# Patient Record
Sex: FEMALE | Race: BLACK OR AFRICAN AMERICAN | NOT HISPANIC OR LATINO | Employment: FULL TIME | URBAN - METROPOLITAN AREA
[De-identification: names, ages, dates, MRNs, and addresses within clinical notes are randomized per-mention and may not be internally consistent; named-entity substitution may affect disease eponyms.]

---

## 2018-01-23 LAB
INFLUENZA A ANTIGEN, POC: NEGATIVE
INFLUENZA B ANTIGEN, POC: NEGATIVE
RAPID GROUP A STREP (OHS): NEGATIVE

## 2019-01-22 ENCOUNTER — HISTORICAL (OUTPATIENT)
Dept: LAB | Facility: HOSPITAL | Age: 39
End: 2019-01-22

## 2019-01-22 ENCOUNTER — HISTORICAL (OUTPATIENT)
Dept: RADIOLOGY | Facility: HOSPITAL | Age: 39
End: 2019-01-22

## 2019-01-22 LAB
ABS NEUT (OLG): 3.78 X10(3)/MCL (ref 2.1–9.2)
AMPHET UR QL SCN: NORMAL
APPEARANCE, UA: CLEAR
B-HCG FREE SERPL-ACNC: NORMAL MIU/ML
BACTERIA SPEC CULT: ABNORMAL /HPF
BARBITURATE SCN PRESENT UR: NORMAL
BASOPHILS # BLD AUTO: 0 X10(3)/MCL (ref 0–0.2)
BASOPHILS NFR BLD AUTO: 0 %
BENZODIAZ UR QL SCN: NORMAL
BILIRUB UR QL STRIP: NEGATIVE
CANNABINOIDS UR QL SCN: NORMAL
COCAINE UR QL SCN: NORMAL
COLOR UR: YELLOW
EOSINOPHIL # BLD AUTO: 0 X10(3)/MCL (ref 0–0.9)
EOSINOPHIL NFR BLD AUTO: 1 %
ERYTHROCYTE [DISTWIDTH] IN BLOOD BY AUTOMATED COUNT: 17.7 % (ref 11.5–17)
GLUCOSE (UA): NEGATIVE
GROUP & RH: NORMAL
HBV SURFACE AG SERPL QL IA: NEGATIVE
HCT VFR BLD AUTO: 34.9 % (ref 37–47)
HGB BLD-MCNC: 10.5 GM/DL (ref 12–16)
HGB UR QL STRIP: NEGATIVE
HIV 1+2 AB+HIV1 P24 AG SERPL QL IA: NEGATIVE
KETONES UR QL STRIP: NEGATIVE
LEUKOCYTE ESTERASE UR QL STRIP: ABNORMAL
LYMPHOCYTES # BLD AUTO: 0.8 X10(3)/MCL (ref 0.6–4.6)
LYMPHOCYTES NFR BLD AUTO: 16 %
MCH RBC QN AUTO: 26.4 PG (ref 27–31)
MCHC RBC AUTO-ENTMCNC: 30.1 GM/DL (ref 33–36)
MCV RBC AUTO: 87.7 FL (ref 80–94)
MONOCYTES # BLD AUTO: 0.5 X10(3)/MCL (ref 0.1–1.3)
MONOCYTES NFR BLD AUTO: 9 %
NEUTROPHILS # BLD AUTO: 3.78 X10(3)/MCL (ref 2.1–9.2)
NEUTROPHILS NFR BLD AUTO: 74 %
NITRITE UR QL STRIP: NEGATIVE
OPIATES UR QL SCN: NORMAL
PCP UR QL: NORMAL
PH UR STRIP.AUTO: 6 [PH] (ref 5–7.5)
PH UR STRIP: 6 [PH] (ref 5–9)
PLATELET # BLD AUTO: 277 X10(3)/MCL (ref 130–400)
PMV BLD AUTO: 11.6 FL (ref 9.4–12.4)
PROT UR QL STRIP: NEGATIVE
RBC # BLD AUTO: 3.98 X10(6)/MCL (ref 4.2–5.4)
RBC #/AREA URNS HPF: ABNORMAL /[HPF]
SP GR FLD REFRACTOMETRY: 1.01 (ref 1–1.03)
SP GR UR STRIP: 1.01 (ref 1–1.03)
SQUAMOUS EPITHELIAL, UA: ABNORMAL
T PALLIDUM AB SER QL: NORMAL
UROBILINOGEN UR STRIP-ACNC: 1
WBC # SPEC AUTO: 5.1 X10(3)/MCL (ref 4.5–11.5)
WBC #/AREA URNS HPF: ABNORMAL /[HPF]

## 2020-06-24 LAB
PAP RECOMMENDATION EXT: NORMAL
PAP SMEAR: NORMAL

## 2022-04-10 ENCOUNTER — HISTORICAL (OUTPATIENT)
Dept: ADMINISTRATIVE | Facility: HOSPITAL | Age: 42
End: 2022-04-10

## 2022-04-28 VITALS
HEIGHT: 69 IN | WEIGHT: 218.25 LBS | DIASTOLIC BLOOD PRESSURE: 89 MMHG | SYSTOLIC BLOOD PRESSURE: 162 MMHG | OXYGEN SATURATION: 98 % | BODY MASS INDEX: 32.33 KG/M2

## 2022-09-21 ENCOUNTER — HISTORICAL (OUTPATIENT)
Dept: ADMINISTRATIVE | Facility: HOSPITAL | Age: 42
End: 2022-09-21

## 2023-05-03 ENCOUNTER — OFFICE VISIT (OUTPATIENT)
Dept: FAMILY MEDICINE | Facility: CLINIC | Age: 43
End: 2023-05-03
Payer: COMMERCIAL

## 2023-05-03 VITALS
HEART RATE: 71 BPM | HEIGHT: 69 IN | SYSTOLIC BLOOD PRESSURE: 143 MMHG | TEMPERATURE: 98 F | BODY MASS INDEX: 33.2 KG/M2 | WEIGHT: 224.19 LBS | OXYGEN SATURATION: 100 % | RESPIRATION RATE: 20 BRPM | DIASTOLIC BLOOD PRESSURE: 88 MMHG

## 2023-05-03 DIAGNOSIS — R22.1 NECK SWELLING: ICD-10-CM

## 2023-05-03 DIAGNOSIS — Z00.00 WELLNESS EXAMINATION: Primary | ICD-10-CM

## 2023-05-03 DIAGNOSIS — R03.0 ELEVATED BLOOD PRESSURE READING: ICD-10-CM

## 2023-05-03 PROCEDURE — 99386 PR PREVENTIVE VISIT,NEW,40-64: ICD-10-PCS | Mod: ,,, | Performed by: FAMILY MEDICINE

## 2023-05-03 PROCEDURE — 99386 PREV VISIT NEW AGE 40-64: CPT | Mod: ,,, | Performed by: FAMILY MEDICINE

## 2023-05-03 PROCEDURE — 3079F DIAST BP 80-89 MM HG: CPT | Mod: CPTII,,, | Performed by: FAMILY MEDICINE

## 2023-05-03 PROCEDURE — 3077F SYST BP >= 140 MM HG: CPT | Mod: CPTII,,, | Performed by: FAMILY MEDICINE

## 2023-05-03 PROCEDURE — 3008F BODY MASS INDEX DOCD: CPT | Mod: CPTII,,, | Performed by: FAMILY MEDICINE

## 2023-05-03 PROCEDURE — 3008F PR BODY MASS INDEX (BMI) DOCUMENTED: ICD-10-PCS | Mod: CPTII,,, | Performed by: FAMILY MEDICINE

## 2023-05-03 PROCEDURE — 1159F PR MEDICATION LIST DOCUMENTED IN MEDICAL RECORD: ICD-10-PCS | Mod: CPTII,,, | Performed by: FAMILY MEDICINE

## 2023-05-03 PROCEDURE — 3079F PR MOST RECENT DIASTOLIC BLOOD PRESSURE 80-89 MM HG: ICD-10-PCS | Mod: CPTII,,, | Performed by: FAMILY MEDICINE

## 2023-05-03 PROCEDURE — 1160F PR REVIEW ALL MEDS BY PRESCRIBER/CLIN PHARMACIST DOCUMENTED: ICD-10-PCS | Mod: CPTII,,, | Performed by: FAMILY MEDICINE

## 2023-05-03 PROCEDURE — 1160F RVW MEDS BY RX/DR IN RCRD: CPT | Mod: CPTII,,, | Performed by: FAMILY MEDICINE

## 2023-05-03 PROCEDURE — 1159F MED LIST DOCD IN RCRD: CPT | Mod: CPTII,,, | Performed by: FAMILY MEDICINE

## 2023-05-03 PROCEDURE — 3077F PR MOST RECENT SYSTOLIC BLOOD PRESSURE >= 140 MM HG: ICD-10-PCS | Mod: CPTII,,, | Performed by: FAMILY MEDICINE

## 2023-05-03 NOTE — PROGRESS NOTES
"  Patient ID: [id number]     Chief Complaint: Establish Care        HPI:     Telma Asher is a 42 y.o. female here today   Patient here to establish with PCP/Wellness visit- self referred to clinic by herself.  Patient is interested in obtaining baseline labs.    History reviewed. No pertinent past medical history.     Past Surgical History:   Procedure Laterality Date     SECTION      x3- 2001; 2006; 2008        Social History     Tobacco Use    Smoking status: Never    Smokeless tobacco: Never   Substance Use Topics    Alcohol use: Yes     Comment: ocassionally    Drug use: Never        Social History     Socioeconomic History    Marital status:      Spouse name: Bony Coelho Jr.    Number of children: 3        No current outpatient medications    Review of patient's allergies indicates:  No Known Allergies     Patient Care Team:  Sandra Love MD as PCP - General (Family Medicine)     Subjective:     Review of Systems    12 point review of systems conducted, negative except as stated in the history of present illness. See HPI for details.      Objective:     Visit Vitals  BP (!) 143/88   Pulse 71   Temp 97.9 °F (36.6 °C)   Resp 20   Ht 5' 9" (1.753 m)   Wt 101.7 kg (224 lb 3.2 oz)   LMP 2023 (Exact Date)   SpO2 100%   BMI 33.11 kg/m²       Physical Exam    General: Alert and oriented, No acute distress.  Head: Normocephalic, Atraumatic.  Eye: Pupils are equal, round and reactive to light, Extraocular movements are intact, Sclera non-icteric.  Ears/Nose/Throat: Normal, Mucosa moist,Clear.  Neck/Thyroid: Non-tender, No palpable thyromegaly or thyroid nodule, No lymphadenopathy, No JVD, Full range of motion.  Respiratory: Clear to auscultation bilaterally; No wheezes, rales or rhonchi,Non-labored respirations, Symmetrical chest wall expansion.  Cardiovascular: Regular rate and rhythm, S1/S2 normal, No murmurs, rubs or gallops.  Gastrointestinal: Soft, Non-tender, Non-distended, Normal " "bowel sounds, No palpable organomegaly.  Musculoskeletal: Normal range of motion.  Integumentary: Warm, Dry, Intact, No suspicious lesions or rashes.  Extremities: No clubbing, cyanosis or edema  Neurologic: No focal deficits, Cranial Nerves II-XII are grossly intact, Motor strength normal upper and lower extremities, Sensory exam intact.  Psychiatric: Normal interaction, Coherent speech, Euthymic mood, Appropriate affect       Assessment:       ICD-10-CM ICD-9-CM   1. Wellness examination  Z00.00 V70.0   2. Neck swelling  R22.1 784.2   3. Elevated blood pressure reading  R03.0 796.2        Plan:     Cervical Cancer Screening -  Patient to be referred to Gyn    Breast Cancer Screening - Mammogram orders given to patient.    Colon Cancer Screening - Colonoscopy to be referred at age 46    Eye Exam - Recommend annually.    Dental Exam - Recommend biannual exams.     Vaccinations -   There is no immunization history on file for this patient.       1. Wellness examination  Wellness: Five Rules Reviewed: Water/Nutrition-Real Food, Limit Fast Food/ Exercise 20-30 minutes most days of the week/ Mental health- "Is your work a calling or paycheck" Define 'What is your purpose-what matters to you' Stress- Is an Individual Response- Therefore Can be Controlled by the Individual. Meditation/ Immunizations/Multivitamin use-Vitamin D3/ Screenings-Patient given lab orders to be completed before wellness visit.    - CBC Auto Differential; Future  - Comprehensive Metabolic Panel; Future  - Lipid Panel; Future  - TSH; Future  - Hemoglobin A1C; Future  - Urinalysis; Future  - Hepatitis C Antibody; Future  - HIV 1/2 Ag/Ab (4th Gen); Future  - Mammo Digital Screening Bilat; Future  - Ambulatory referral/consult to Obstetrics / Gynecology; Future  - Urinalysis    2. Neck swelling  Check studies management based upon results.  Pathophysiology/treatment/dangers discussed.    - US Soft Tissue Head Neck Thyroid; Future    3. Elevated blood " pressure  Blood pressure is elevated- Pathophysiology/Treatment/ Dangers Discussed-patient to monitor blood pressure at home bring readings to next visit.          The patient's Health Maintenance was reviewed and the following appears to be due at this time:   Health Maintenance Due   Topic Date Due    Hepatitis C Screening  Never done    Cervical Cancer Screening  Never done    Lipid Panel  Never done    COVID-19 Vaccine (1) Never done    TETANUS VACCINE  Never done    Mammogram  Never done    Hemoglobin A1c (Diabetic Prevention Screening)  Never done         Follow up in about 2 weeks (around 5/17/2023). In addition to their scheduled follow up, the patient has also been instructed to follow up on as needed basis.     Future Appointments   Date Time Provider Department Center   5/4/2023  3:30 PM [facility] [place]AM[facility] [place] [facility]   5/4/2023  4:00 PM [facility] [place]1 [facility] [place] [facility]   5/22/2023  8:45 AM MD BRIAN Gomez MD

## 2023-05-10 ENCOUNTER — PATIENT OUTREACH (OUTPATIENT)
Dept: ADMINISTRATIVE | Facility: HOSPITAL | Age: 43
End: 2023-05-10
Payer: COMMERCIAL

## 2023-05-10 NOTE — PROGRESS NOTES
The following record(s)  below were uploaded for Health Maintenance .             PAP SMEAR  06/24/2020

## 2023-05-15 ENCOUNTER — HOSPITAL ENCOUNTER (OUTPATIENT)
Dept: RADIOLOGY | Facility: HOSPITAL | Age: 43
Discharge: HOME | End: 2023-05-15
Attending: FAMILY MEDICINE
Payer: COMMERCIAL

## 2023-05-15 DIAGNOSIS — R92.8 ABNORMAL MAMMOGRAM: ICD-10-CM

## 2023-05-15 PROCEDURE — 77061 BREAST TOMOSYNTHESIS UNI: CPT | Mod: 26,LT,, | Performed by: STUDENT IN AN ORGANIZED HEALTH CARE EDUCATION/TRAINING PROGRAM

## 2023-05-15 PROCEDURE — 77061 BREAST TOMOSYNTHESIS UNI: CPT | Mod: TC,LT

## 2023-05-15 PROCEDURE — 76642 US BREAST LEFT LIMITED: ICD-10-PCS | Mod: 26,LT,, | Performed by: STUDENT IN AN ORGANIZED HEALTH CARE EDUCATION/TRAINING PROGRAM

## 2023-05-15 PROCEDURE — 77065 MAMMO DIGITAL DIAGNOSTIC LEFT WITH TOMO: ICD-10-PCS | Mod: 26,LT,, | Performed by: STUDENT IN AN ORGANIZED HEALTH CARE EDUCATION/TRAINING PROGRAM

## 2023-05-15 PROCEDURE — 76642 ULTRASOUND BREAST LIMITED: CPT | Mod: 26,LT,, | Performed by: STUDENT IN AN ORGANIZED HEALTH CARE EDUCATION/TRAINING PROGRAM

## 2023-05-15 PROCEDURE — 76642 ULTRASOUND BREAST LIMITED: CPT | Mod: TC,LT

## 2023-05-15 PROCEDURE — 77065 DX MAMMO INCL CAD UNI: CPT | Mod: 26,LT,, | Performed by: STUDENT IN AN ORGANIZED HEALTH CARE EDUCATION/TRAINING PROGRAM

## 2023-05-15 PROCEDURE — 76882 US LMTD JT/FCL EVL NVASC XTR: CPT | Mod: 26,59,RT, | Performed by: STUDENT IN AN ORGANIZED HEALTH CARE EDUCATION/TRAINING PROGRAM

## 2023-05-15 PROCEDURE — 76882 US LMTD JT/FCL EVL NVASC XTR: CPT | Mod: TC,RT

## 2023-05-15 PROCEDURE — 76882 US AXILLA ONLY (BREAST IMAGING) RIGHT: ICD-10-PCS | Mod: 26,59,RT, | Performed by: STUDENT IN AN ORGANIZED HEALTH CARE EDUCATION/TRAINING PROGRAM

## 2023-05-15 PROCEDURE — 77061 MAMMO DIGITAL DIAGNOSTIC LEFT WITH TOMO: ICD-10-PCS | Mod: 26,LT,, | Performed by: STUDENT IN AN ORGANIZED HEALTH CARE EDUCATION/TRAINING PROGRAM

## 2023-05-18 ENCOUNTER — HOSPITAL ENCOUNTER (OUTPATIENT)
Dept: RADIOLOGY | Facility: HOSPITAL | Age: 43
Discharge: HOME | End: 2023-05-18
Attending: FAMILY MEDICINE
Payer: COMMERCIAL

## 2023-05-18 DIAGNOSIS — R59.0 ENLARGED LYMPH NODES IN ARMPIT: ICD-10-CM

## 2023-05-18 PROCEDURE — 38505 NEEDLE BIOPSY LYMPH NODES: CPT

## 2023-05-18 PROCEDURE — 38505 NEEDLE BIOPSY LYMPH NODES: CPT | Mod: LT,,, | Performed by: RADIOLOGY

## 2023-05-18 PROCEDURE — 77061 BREAST TOMOSYNTHESIS UNI: CPT | Mod: TC,LT

## 2023-05-18 PROCEDURE — 77065 DX MAMMO INCL CAD UNI: CPT | Mod: 26,LT,, | Performed by: RADIOLOGY

## 2023-05-18 PROCEDURE — 38505 US BIOPSY LYMPH NODE AXILLA: ICD-10-PCS | Mod: LT,,, | Performed by: RADIOLOGY

## 2023-05-18 PROCEDURE — 77061 BREAST TOMOSYNTHESIS UNI: CPT | Mod: 26,LT,, | Performed by: RADIOLOGY

## 2023-05-18 PROCEDURE — 77065 MAMMO DIGITAL DIAGNOSTIC LEFT WITH TOMO: ICD-10-PCS | Mod: 26,LT,, | Performed by: RADIOLOGY

## 2023-05-18 PROCEDURE — 77061 MAMMO DIGITAL DIAGNOSTIC LEFT WITH TOMO: ICD-10-PCS | Mod: 26,LT,, | Performed by: RADIOLOGY

## 2023-05-19 ENCOUNTER — LAB REQUISITION (OUTPATIENT)
Dept: LAB | Facility: HOSPITAL | Age: 43
End: 2023-05-19
Payer: COMMERCIAL

## 2023-05-19 DIAGNOSIS — R59.9 ENLARGED LYMPH NODES, UNSPECIFIED: ICD-10-CM

## 2023-05-19 PROCEDURE — 88188 FLOWCYTOMETRY/READ 9-15: CPT | Performed by: PATHOLOGY

## 2023-05-19 PROCEDURE — 88184 FLOWCYTOMETRY/ TC 1 MARKER: CPT

## 2023-05-19 PROCEDURE — 88185 FLOWCYTOMETRY/TC ADD-ON: CPT

## 2023-05-22 LAB — MAYO GENERIC ORDERABLE RESULT: NORMAL

## 2023-05-23 LAB — PSYCHE PATHOLOGY RESULT: NORMAL

## 2023-05-24 ENCOUNTER — TELEPHONE (OUTPATIENT)
Dept: FAMILY MEDICINE | Facility: CLINIC | Age: 43
End: 2023-05-24
Payer: COMMERCIAL

## 2023-05-24 DIAGNOSIS — R59.9 ENLARGED LYMPH NODES, UNSPECIFIED: Primary | ICD-10-CM

## 2023-05-24 NOTE — PROGRESS NOTES
Axillary node biopsy path reviewed per Dr. Nunez--inconclusive result, recommend excision for further evaluation. Results and recommendation called to patient. Per patient request/availability, appointment scheduled with Dr. Nunes 6/1/23 @ 10:00 am. Patient is aware.

## 2023-05-24 NOTE — TELEPHONE ENCOUNTER
Patient called requesting a call back to discuss her biopsy. She stated that she saw some things that concerned her and she would like to talk to you about it. Patient has appointment scheduled for 5/29/23. Call back number is [phone]

## 2023-05-26 ENCOUNTER — DOCUMENTATION ONLY (OUTPATIENT)
Dept: SURGERY | Facility: CLINIC | Age: 43
End: 2023-05-26
Payer: COMMERCIAL

## 2023-05-26 NOTE — PROGRESS NOTES
05/26/23 08:52AM- Spoke w/ patient to review medication, allergies, and history; patient confirmed appt....tcc

## 2023-05-26 NOTE — PROGRESS NOTES
[facility] Breast Surg  Breast Surgical Oncology  New Patient Office Visit - H&P      Referring Provider: Dr. Sandra Love   PCP: Sandra Love MD     Chief Complaint:   Chief Complaint   Patient presents with    New Patient Breast Visit     New patient presented for follow up post ANB pathology inconclusive results. Was referred by Dr. Love.        Subjective:     HPI:  Telma Asher is a 42 y.o. female who presents on 2023 for evaluation of  left axillary adenopathy . She has a history of developing cold like symptoms (mostly consisting of cough, sore throat and sinus congestions) just before presenting for her screening mammogram on 2023. She subsequently found out her daughter had Covid and her symptoms worsened between the time of her additional views and her biopsy. The day after her biopsy she presented to the urgent care where they tested her for RSV (per patient - don't see in care everywhere records) and Covid. She was negative Covid and positive for RSV (per report). She was not given any medications, but conservative measures were discussed. She has since resolved. She also has a negative review of symptoms as listed below.    A detailed patient history was obtained and reviewed. She currently denies any breast issues including rashes, redness, pain, swelling, nipple discharge, or new lumps/masses.    MG breast density: Category B (scattered areas of fibroglandular tissue)     Imagin2023 BL SC MG at [facility] - which revealed in L breast  a focal asymmetry in the upper outer quadrant posterior depth.  There are lymph nodes with apparent cortical thickening within bilateral axillae. BIRADS-0;additional imaging needed.  5/15/2023 L DG MG/US L BREAST LIMITED/R US AXILLA at [facility] - which revealed on   L MG the focal asymmetry in the upper-outer quadrant posterior depth  effaces into normal fibroglandular tissue.  The mammographic view of the L axilla  demonstrates apparent cortical thickening of the axillary lymph nodes within the field of view. No other significant mammographic finding in the left breast and axilla.   L US - no suspicious sonographic finding in the upper-outer quadrant.  Along the left breast 1 o'clock - 2 o'clock area 10-12 cm FN, there is only normal fibroductoglandular tissue, correlating with the focal asymmetry recalled from screening mammography. In the level 1 steven stations of both axillae, there are morphologically abnormal lymph nodes with uniform cortical thickening (up to 0.6 cm) and partial to complete effacement of their fatty funmi.  BIRADS-4 suspicious;biopsy recommended.        Pathology:  2023 Ultrasound-guided Core Needle Biopsy Left Axilla - Lymph node with small CD 19, CD 20, and CD 10 positive, Kappa-restricted B cell population as tested by flow cytometry  IMMUNOHISTOCHEMICAL STAINS:   CD20 AND BCL-2: POSITIVE IN B LYMPHOCYTES.   CD3, CD5 AND CD23: POSITIVE IN T LYMPHOCYTES.   BCL-6 AND CD10: NEGATIVE.   KAPPA AND LAMBDA: INCONCLUSIVE.   KI-67: SHOWS NUCLEAR POSITIVITY IN 3-4% OF LYMPHOCYTES  Note - Morphologic and immunohistochemical analysis does not demonstrate evidence of lymphoma. The significance of the population identified by flow cytometry is unclear and may be due to sampling or oligoclonal reactive process, among others. Recommend excision for further evaluation.    OB/GYN History:  Age at Menarche Onset: 13  Menopausal Status: premenopausal, LMP: Patient's last menstrual period was 2023 (exact date).  Hysterectomy/Oophorectomy: NA, at age NA (one ovary removed)  Hormonal birth control (duration): Yes OCP (estrogen/progesterone).   Pregnancy History:   Age at first live birth: 20  Hormone Replacement Therapy: No, none  Patient did not breast feed.  Patient denies nipple discharge.   Patient denies to previous breast biopsy.   Patient denies to a personal history of breast cancer.    Other Relevant  History:  Prior thoracic RT: none  Genetic testing: No  Ashkenazi Druze descent: No    Family History:  Family History   Problem Relation Age of Onset    No Known Problems Mother     No Known Problems Father     Hypertension Maternal Grandmother     No Known Problems Paternal Grandmother     No Known Problems Paternal Grandfather         Past History:  Past Medical History:   Diagnosis Date    Endometriosis of uterus 2019    Removed at time of etopic pregnancy    Pregnancy 2019    I had an etopic pregnancy        Past Surgical History:   Procedure Laterality Date     SECTION      x3- ; ; 2008     SECTION  ,,, 2019     delivers and 1-etopic pregnancy 2019    LYMPH NODE BIOPSY Left 2023        Social History     Socioeconomic History    Marital status:      Spouse name: Bony Coelho Jr.    Number of children: 3   Occupational History    Occupation: [employer]:    Tobacco Use    Smoking status: Never    Smokeless tobacco: Never   Substance and Sexual Activity    Alcohol use: Yes     Comment: ocassionally    Drug use: Never    Sexual activity: Yes     Partners: Male     Birth control/protection: None        Body mass index is 33.2 kg/m².     Allergy/Medications:   Review of patient's allergies indicates:  No Known Allergies       Current Outpatient Medications:     Lactobac no.41/Bifidobact no.7 (PROBIOTIC-10 ORAL), Take by mouth. Nature Target Probiotics. .07oz, Disp: , Rfl:     mv-min/folic/vit K/lycop/coQ10 (DAILY MULTIVITAMIN ORAL), Take by mouth. [place] light multivitamin, Disp: , Rfl:     thymol/chlorophyllin (CHLOROPHYLL ORAL), Take by mouth. With Elderberry, ACV, [place], echinacea, and Vitamin D3, C, B12, Disp: , Rfl:        Review of Systems:  Review of Systems   Constitutional:  Negative for chills, fever and weight loss.   HENT:  Negative for sore throat.    Eyes:  Negative for blurred vision and double vision.   Respiratory:   "Negative for cough and shortness of breath.    Cardiovascular:  Negative for chest pain, palpitations and leg swelling.   Gastrointestinal:  Negative for abdominal pain, constipation, diarrhea, heartburn, nausea and vomiting.   Genitourinary:  Negative for dysuria, flank pain, frequency, hematuria and urgency.   Musculoskeletal:  Negative for back pain, joint pain and myalgias.   Neurological:  Negative for dizziness and headaches.   Endo/Heme/Allergies:  Does not bruise/bleed easily.   Psychiatric/Behavioral:  Negative for depression. The patient is not nervous/anxious.         Objective:     Vitals:  Blood pressure 123/77, pulse 78, temperature 98.5 °F (36.9 °C), temperature source Oral, resp. rate 18, height 5' 9" (1.753 m), weight 102 kg (224 lb 12.8 oz), last menstrual period 05/28/2023, SpO2 100 %.      Physical Exam:  General: The patient is awake, alert and oriented times three. The patient is well nourished and in no acute distress.   Neck: There is no evidence of palpable cervical, supraclavicular or axillary adenopathy. The neck is supple. The thyroid is not enlarged.   Musculoskeletal: The patient has a normal range of motion of her bilateral upper extremities.   Chest: Examination of the chest wall fails to reveal any obvious abnormalities. The lungs are clear to auscultation bilaterally without rales, rhonchi, or wheezing.   Cardiovascular: The heart has a regular rate and rhythm without murmurs, gallops or rubs.  Breast:  Right: Examination of right breast fails to reveal any dominant masses or areas of significant focal nodularity. The nipple is everted without evidence of discharge. There is no skin dimpling with movement of the pectoralis. There are no significant skin changes overlying the breast.   Left: Examination of the left breast fails to reveal any dominant masses or areas of significant focal nodularity. The nipple is everted without evidence of discharge. There is no skin dimpling with " "movement of the pectoralis. There are no significant skin changes overlying the breast.  Abdomen: The abdomen is soft, flat, nontender and nondistended with no palpable masses or organomegaly.  Integumentary: no rashes or skin lesions present  Neurologic: cranial nerves intact, no signs of peripheral neurological deficit, motor/sensory function intact    Assessment and Discussion:       Encounter Diagnoses   Name Primary?    Axillary lymphadenopathy Yes      Axillary lymphadenopathy can have many causes, such as infection, cancer, drug reactions, and more. She is not entirely wanting to have a surgical biopsy and she understands the biopsy results state morphologically no lymphoma was identified, however, there was a small amount of additional markers which makes the results difficult to interpret.    After discussion with her and colleagues, repeat short-term follow-up US would be an acceptable option, given the patient's clinical history. I would have a high index for surgical excision if persistent enlargement is seen.     We also discussed the surgical excision of a lymph node could be done with seed localization or palpation. However, currently on clinical exam given the habitus and pre-axillary adipose tissue, would recommend localization.       I would also like to obtain a CBC with differential        Plan:         Problem List Items Addressed This Visit          Other    Axillary lymphadenopathy - Primary    Current Assessment & Plan     1. Recommend she have a repeat Bilateral axillary US in 1-2 months (July 2023) after resolution of her symptoms. If at that time she continues to have abnormal lymph nodes, consideration for excisional biopsy is recommended.  2. Her pathology results from her recent core needle biopsy and clinical history were discussed with pathology (Dr. Tucker) and Hem/Onc (Dr. Win) "curbside" consultants.  3. Recommend a CBC with differential as well.              All of questions were " answered    Sherri Nunes MD  Breast Surgical Oncology     OFFICE VISIT CODING:    Non-face-to-face time included:  Yes Preparing to see the patient such as reviewing the patient record  Yes Obtaining and reviewing separately obtained history  Yes Independently interpreting results  Yes Documenting clinical information in electronic health record  No Ordering appropriate medications  Yes Ordering appropriate tests  Yes Ordering appropriate procedures (including follow-up)  Yes Referring and communicating with other health care professionals (not separately reported)  Yes Care Coordination (not separately reported)    Face-to-face time included:  Yes Performing a medically necessary appropriate history, examination, and/or evaluation  Yes Communicating results to the patient/family/caregiver  Yes Counseling and educating the patient/family/caregiver  Yes Answering patient/family/caregiver questions    Total Time: 57 minutes    Total time includes both face-to-face and non-face-to-face time personally spent by myself on the day of the visit.

## 2023-05-29 ENCOUNTER — OFFICE VISIT (OUTPATIENT)
Dept: FAMILY MEDICINE | Facility: CLINIC | Age: 43
End: 2023-05-29
Payer: COMMERCIAL

## 2023-05-29 VITALS
TEMPERATURE: 98 F | WEIGHT: 223 LBS | BODY MASS INDEX: 33.03 KG/M2 | OXYGEN SATURATION: 100 % | DIASTOLIC BLOOD PRESSURE: 80 MMHG | RESPIRATION RATE: 20 BRPM | HEIGHT: 69 IN | HEART RATE: 86 BPM | SYSTOLIC BLOOD PRESSURE: 140 MMHG

## 2023-05-29 DIAGNOSIS — Z71.2 ENCOUNTER TO DISCUSS TEST RESULTS: Primary | ICD-10-CM

## 2023-05-29 DIAGNOSIS — R03.0 ELEVATED BLOOD PRESSURE READING: ICD-10-CM

## 2023-05-29 PROCEDURE — 1159F MED LIST DOCD IN RCRD: CPT | Mod: CPTII,,, | Performed by: FAMILY MEDICINE

## 2023-05-29 PROCEDURE — 3077F PR MOST RECENT SYSTOLIC BLOOD PRESSURE >= 140 MM HG: ICD-10-PCS | Mod: CPTII,,, | Performed by: FAMILY MEDICINE

## 2023-05-29 PROCEDURE — 3077F SYST BP >= 140 MM HG: CPT | Mod: CPTII,,, | Performed by: FAMILY MEDICINE

## 2023-05-29 PROCEDURE — 1160F RVW MEDS BY RX/DR IN RCRD: CPT | Mod: CPTII,,, | Performed by: FAMILY MEDICINE

## 2023-05-29 PROCEDURE — 3079F PR MOST RECENT DIASTOLIC BLOOD PRESSURE 80-89 MM HG: ICD-10-PCS | Mod: CPTII,,, | Performed by: FAMILY MEDICINE

## 2023-05-29 PROCEDURE — 1160F PR REVIEW ALL MEDS BY PRESCRIBER/CLIN PHARMACIST DOCUMENTED: ICD-10-PCS | Mod: CPTII,,, | Performed by: FAMILY MEDICINE

## 2023-05-29 PROCEDURE — 1159F PR MEDICATION LIST DOCUMENTED IN MEDICAL RECORD: ICD-10-PCS | Mod: CPTII,,, | Performed by: FAMILY MEDICINE

## 2023-05-29 PROCEDURE — 99213 PR OFFICE/OUTPT VISIT, EST, LEVL III, 20-29 MIN: ICD-10-PCS | Mod: ,,, | Performed by: FAMILY MEDICINE

## 2023-05-29 PROCEDURE — 99213 OFFICE O/P EST LOW 20 MIN: CPT | Mod: ,,, | Performed by: FAMILY MEDICINE

## 2023-05-29 PROCEDURE — 3008F PR BODY MASS INDEX (BMI) DOCUMENTED: ICD-10-PCS | Mod: CPTII,,, | Performed by: FAMILY MEDICINE

## 2023-05-29 PROCEDURE — 3008F BODY MASS INDEX DOCD: CPT | Mod: CPTII,,, | Performed by: FAMILY MEDICINE

## 2023-05-29 PROCEDURE — 3079F DIAST BP 80-89 MM HG: CPT | Mod: CPTII,,, | Performed by: FAMILY MEDICINE

## 2023-05-29 NOTE — PROGRESS NOTES
"   Patient ID: [id number]     Chief Complaint: Follow-up      HPI:     Telma Asher is a 42 y.o. female here today to discuss test results.  1) Abnormal finding left axillary lymph node:  Patient is wondering about relationship of COVID exposure/RSV exposure and abnormal finding.  Does have follow-up appointment to discuss removal versus monitoring area.  2) Elevated blood pressure reading:  Patient has not been monitoring her blood pressure at home.  No symptoms associated with increased blood pressure-such as headaches, dizziness, chest pain, or shortness of breath.  History reviewed. No pertinent past medical history.     Past Surgical History:   Procedure Laterality Date     SECTION      x3- 2001; 2006; 2008    LYMPH NODE BIOPSY Left 2023        Social History     Tobacco Use    Smoking status: Never    Smokeless tobacco: Never   Substance Use Topics    Alcohol use: Yes     Comment: ocassionally    Drug use: Never        Social History     Socioeconomic History    Marital status:      Spouse name: Bony Coelho Jr.    Number of children: 3        No current outpatient medications    Review of patient's allergies indicates:  No Known Allergies     Patient Care Team:  Sandra Love MD as PCP - General (Family Medicine)  Inez Mcclendon LPN as Care Coordinator  Mile Akbar RN as Registered Nurse (Diagnostic Radiology)       Subjective:     Review of Systems    12 point review of systems conducted, negative except as stated in the history of present illness. See HPI for details.      Objective:     Visit Vitals  BP (!) 140/80 (BP Location: Left arm, Patient Position: Sitting)   Pulse 86   Temp 97.6 °F (36.4 °C)   Resp 20   Ht 5' 9" (1.753 m)   Wt 101.2 kg (223 lb)   LMP 2023 (Exact Date)   SpO2 100%   BMI 32.93 kg/m²       Physical Exam    General: Alert and oriented, No acute distress.  Head: Normocephalic, Atraumatic.  Eye: Pupils are equal, round and reactive to light, " Extraocular movements are intact, Sclera non-icteric.  Ears/Nose/Throat: Normal, Mucosa moist,Clear.  Neck/Thyroid: Supple, Full range of motion.  Respiratory: Clear to auscultation bilaterally; No wheezes, rales or rhonchi  Cardiovascular: Regular rate and rhythm  Gastrointestinal: Soft, Non-tender, Non-distended, Normal bowel sound  Musculoskeletal: Normal range of motion.  Integumentary: Warm, Dry, Intact  Extremities: No clubbing, cyanosis or edema  Neurologic: No focal deficits, Cranial Nerves II-XII are grossly intact.  Psychiatric: Normal interaction, Coherent speech, Euthymic mood, Appropriate affect       Assessment:       ICD-10-CM ICD-9-CM   1. Encounter to discuss test results  Z71.2 V65.49   2. Elevated blood pressure reading  R03.0 796.2        Plan:     1. Encounter to discuss test results  Patient concerns discussed and addressed-further management options by Dr. Nunez.  Also reviewed results of thyroid ultrasound.      2. Elevated blood pressure reading  Pathophysiology/Treatment/ Dangers Discussed.  Patient to monitor blood pressure at home-bring readings to office next visit.  Continue to encourage diet and lifestyle modifications.         Follow up in about 4 weeks (around 6/26/2023). In addition to their scheduled follow up, the patient has also been instructed to follow up on as needed basis.     Future Appointments   Date Time Provider Department Center   6/1/2023 10:00 AM Sherri Nunes MD [facility]   6/27/2023  8:30 AM [facility]

## 2023-06-01 ENCOUNTER — OFFICE VISIT (OUTPATIENT)
Dept: SURGERY | Facility: CLINIC | Age: 43
End: 2023-06-01
Payer: COMMERCIAL

## 2023-06-01 VITALS
SYSTOLIC BLOOD PRESSURE: 123 MMHG | BODY MASS INDEX: 33.3 KG/M2 | HEART RATE: 78 BPM | RESPIRATION RATE: 18 BRPM | DIASTOLIC BLOOD PRESSURE: 77 MMHG | WEIGHT: 224.81 LBS | OXYGEN SATURATION: 100 % | TEMPERATURE: 99 F | HEIGHT: 69 IN

## 2023-06-01 DIAGNOSIS — R59.0 AXILLARY LYMPHADENOPATHY: Primary | ICD-10-CM

## 2023-06-01 PROCEDURE — 1159F MED LIST DOCD IN RCRD: CPT | Mod: CPTII,S$GLB,, | Performed by: SURGERY

## 2023-06-01 PROCEDURE — 99214 PR OFFICE/OUTPT VISIT, EST, LEVL IV, 30-39 MIN: ICD-10-PCS | Mod: S$GLB,,, | Performed by: SURGERY

## 2023-06-01 PROCEDURE — 3078F PR MOST RECENT DIASTOLIC BLOOD PRESSURE < 80 MM HG: ICD-10-PCS | Mod: CPTII,S$GLB,, | Performed by: SURGERY

## 2023-06-01 PROCEDURE — 3008F BODY MASS INDEX DOCD: CPT | Mod: CPTII,S$GLB,, | Performed by: SURGERY

## 2023-06-01 PROCEDURE — 3078F DIAST BP <80 MM HG: CPT | Mod: CPTII,S$GLB,, | Performed by: SURGERY

## 2023-06-01 PROCEDURE — 3074F SYST BP LT 130 MM HG: CPT | Mod: CPTII,S$GLB,, | Performed by: SURGERY

## 2023-06-01 PROCEDURE — 1160F PR REVIEW ALL MEDS BY PRESCRIBER/CLIN PHARMACIST DOCUMENTED: ICD-10-PCS | Mod: CPTII,S$GLB,, | Performed by: SURGERY

## 2023-06-01 PROCEDURE — 99214 OFFICE O/P EST MOD 30 MIN: CPT | Mod: S$GLB,,, | Performed by: SURGERY

## 2023-06-01 PROCEDURE — 1159F PR MEDICATION LIST DOCUMENTED IN MEDICAL RECORD: ICD-10-PCS | Mod: CPTII,S$GLB,, | Performed by: SURGERY

## 2023-06-01 PROCEDURE — 99999 PR PBB SHADOW E&M-EST. PATIENT-LVL V: ICD-10-PCS | Mod: PBBFAC,,, | Performed by: SURGERY

## 2023-06-01 PROCEDURE — 1160F RVW MEDS BY RX/DR IN RCRD: CPT | Mod: CPTII,S$GLB,, | Performed by: SURGERY

## 2023-06-01 PROCEDURE — 99999 PR PBB SHADOW E&M-EST. PATIENT-LVL V: CPT | Mod: PBBFAC,,, | Performed by: SURGERY

## 2023-06-01 PROCEDURE — 3008F PR BODY MASS INDEX (BMI) DOCUMENTED: ICD-10-PCS | Mod: CPTII,S$GLB,, | Performed by: SURGERY

## 2023-06-01 PROCEDURE — 3074F PR MOST RECENT SYSTOLIC BLOOD PRESSURE < 130 MM HG: ICD-10-PCS | Mod: CPTII,S$GLB,, | Performed by: SURGERY

## 2023-06-02 PROBLEM — R59.0 AXILLARY LYMPHADENOPATHY: Status: ACTIVE | Noted: 2023-06-02

## 2023-06-03 NOTE — ASSESSMENT & PLAN NOTE
"1. Recommend she have a repeat Bilateral axillary US in 1-2 months (July 2023) after resolution of her symptoms. If at that time she continues to have abnormal lymph nodes, consideration for excisional biopsy is recommended.  2. Her pathology results from her recent core needle biopsy and clinical history were discussed with pathology (Dr. Tucker) and Hem/Onc (Dr. Win) "curbside" consultants.  3. Recommend a CBC with differential as well.  "

## 2023-06-05 ENCOUNTER — TELEPHONE (OUTPATIENT)
Dept: SURGERY | Facility: CLINIC | Age: 43
End: 2023-06-05
Payer: COMMERCIAL

## 2023-06-05 NOTE — TELEPHONE ENCOUNTER
Spoke with patient regarding her US axilla appointment on 7/17/2023 at 0800, and follow up appointment with Dr. Nunes on 7/25/2023 at 1140. Patient instructed to have CBC drawn prior to her appointment.  Patient verbalized understanding.

## 2023-06-22 ENCOUNTER — LAB VISIT (OUTPATIENT)
Dept: LAB | Facility: HOSPITAL | Age: 43
End: 2023-06-22
Attending: FAMILY MEDICINE
Payer: COMMERCIAL

## 2023-06-22 DIAGNOSIS — Z00.00 WELLNESS EXAMINATION: ICD-10-CM

## 2023-06-22 LAB
ALBUMIN SERPL-MCNC: 3.6 G/DL (ref 3.5–5)
ALBUMIN/GLOB SERPL: 0.9 RATIO (ref 1.1–2)
ALP SERPL-CCNC: 45 UNIT/L (ref 40–150)
ALT SERPL-CCNC: 17 UNIT/L (ref 0–55)
APPEARANCE UR: CLEAR
AST SERPL-CCNC: 20 UNIT/L (ref 5–34)
BACTERIA #/AREA URNS AUTO: NORMAL /HPF
BASOPHILS # BLD AUTO: 0.03 X10(3)/MCL
BASOPHILS NFR BLD AUTO: 1.1 %
BILIRUB UR QL STRIP.AUTO: NEGATIVE MG/DL
BILIRUBIN DIRECT+TOT PNL SERPL-MCNC: 0.7 MG/DL
BUN SERPL-MCNC: 9.1 MG/DL (ref 7–18.7)
CALCIUM SERPL-MCNC: 10 MG/DL (ref 8.4–10.2)
CHLORIDE SERPL-SCNC: 110 MMOL/L (ref 98–107)
CHOLEST SERPL-MCNC: 134 MG/DL
CHOLEST/HDLC SERPL: 3 {RATIO} (ref 0–5)
CO2 SERPL-SCNC: 25 MMOL/L (ref 22–29)
COLOR UR: YELLOW
CREAT SERPL-MCNC: 0.81 MG/DL (ref 0.55–1.02)
EOSINOPHIL # BLD AUTO: 0.03 X10(3)/MCL (ref 0–0.9)
EOSINOPHIL NFR BLD AUTO: 1.1 %
ERYTHROCYTE [DISTWIDTH] IN BLOOD BY AUTOMATED COUNT: 21.2 % (ref 11.5–17)
EST. AVERAGE GLUCOSE BLD GHB EST-MCNC: 105.4 MG/DL
GFR SERPLBLD CREATININE-BSD FMLA CKD-EPI: >60 MLS/MIN/1.73/M2
GLOBULIN SER-MCNC: 4 GM/DL (ref 2.4–3.5)
GLUCOSE SERPL-MCNC: 92 MG/DL (ref 74–100)
GLUCOSE UR QL STRIP.AUTO: NEGATIVE MG/DL
HBA1C MFR BLD: 5.3 %
HCT VFR BLD AUTO: 30.5 % (ref 37–47)
HDLC SERPL-MCNC: 47 MG/DL (ref 35–60)
HGB BLD-MCNC: 8.9 G/DL (ref 12–16)
HIV 1+2 AB+HIV1 P24 AG SERPL QL IA: NONREACTIVE
IMM GRANULOCYTES # BLD AUTO: 0 X10(3)/MCL (ref 0–0.04)
IMM GRANULOCYTES NFR BLD AUTO: 0 %
KETONES UR QL STRIP.AUTO: NEGATIVE MG/DL
LDLC SERPL CALC-MCNC: 80 MG/DL (ref 50–140)
LEUKOCYTE ESTERASE UR QL STRIP.AUTO: NEGATIVE UNIT/L
LYMPHOCYTES # BLD AUTO: 0.68 X10(3)/MCL (ref 0.6–4.6)
LYMPHOCYTES NFR BLD AUTO: 25.4 %
MCH RBC QN AUTO: 23.9 PG (ref 27–31)
MCHC RBC AUTO-ENTMCNC: 29.2 G/DL (ref 33–36)
MCV RBC AUTO: 82 FL (ref 80–94)
MONOCYTES # BLD AUTO: 0.32 X10(3)/MCL (ref 0.1–1.3)
MONOCYTES NFR BLD AUTO: 11.9 %
NEUTROPHILS # BLD AUTO: 1.62 X10(3)/MCL (ref 2.1–9.2)
NEUTROPHILS NFR BLD AUTO: 60.5 %
NITRITE UR QL STRIP.AUTO: NEGATIVE
NRBC BLD AUTO-RTO: 0 %
PH UR STRIP.AUTO: 5.5 [PH]
PLATELET # BLD AUTO: 334 X10(3)/MCL (ref 130–400)
PMV BLD AUTO: 12.3 FL (ref 7.4–10.4)
POTASSIUM SERPL-SCNC: 4.4 MMOL/L (ref 3.5–5.1)
PROT SERPL-MCNC: 7.6 GM/DL (ref 6.4–8.3)
PROT UR QL STRIP.AUTO: NEGATIVE MG/DL
RBC # BLD AUTO: 3.72 X10(6)/MCL (ref 4.2–5.4)
RBC #/AREA URNS AUTO: <5 /HPF
RBC UR QL AUTO: NEGATIVE UNIT/L
SODIUM SERPL-SCNC: 139 MMOL/L (ref 136–145)
SP GR UR STRIP.AUTO: 1.02 (ref 1–1.03)
SQUAMOUS #/AREA URNS AUTO: <5 /HPF
TRIGL SERPL-MCNC: 35 MG/DL (ref 37–140)
TSH SERPL-ACNC: 0.96 UIU/ML (ref 0.35–4.94)
UROBILINOGEN UR STRIP-ACNC: 1 MG/DL
VLDLC SERPL CALC-MCNC: 7 MG/DL
WBC # SPEC AUTO: 2.68 X10(3)/MCL (ref 4.5–11.5)
WBC #/AREA URNS AUTO: <5 /HPF

## 2023-06-22 PROCEDURE — 84443 ASSAY THYROID STIM HORMONE: CPT

## 2023-06-22 PROCEDURE — 87389 HIV-1 AG W/HIV-1&-2 AB AG IA: CPT

## 2023-06-22 PROCEDURE — 36415 COLL VENOUS BLD VENIPUNCTURE: CPT

## 2023-06-22 PROCEDURE — 80061 LIPID PANEL: CPT

## 2023-06-22 PROCEDURE — 83036 HEMOGLOBIN GLYCOSYLATED A1C: CPT

## 2023-06-22 PROCEDURE — 80053 COMPREHEN METABOLIC PANEL: CPT

## 2023-06-22 PROCEDURE — 85025 COMPLETE CBC W/AUTO DIFF WBC: CPT

## 2023-06-22 PROCEDURE — 86803 HEPATITIS C AB TEST: CPT

## 2023-06-23 LAB — HCV AB SERPL QL IA: NONREACTIVE

## 2023-06-27 ENCOUNTER — OFFICE VISIT (OUTPATIENT)
Dept: FAMILY MEDICINE | Facility: CLINIC | Age: 43
End: 2023-06-27
Payer: COMMERCIAL

## 2023-06-27 VITALS
DIASTOLIC BLOOD PRESSURE: 88 MMHG | BODY MASS INDEX: 33.12 KG/M2 | HEIGHT: 69 IN | HEART RATE: 85 BPM | SYSTOLIC BLOOD PRESSURE: 138 MMHG | OXYGEN SATURATION: 100 % | WEIGHT: 223.63 LBS | TEMPERATURE: 98 F

## 2023-06-27 DIAGNOSIS — D50.0 IRON DEFICIENCY ANEMIA DUE TO CHRONIC BLOOD LOSS: ICD-10-CM

## 2023-06-27 DIAGNOSIS — Z71.2 ENCOUNTER TO DISCUSS TEST RESULTS: ICD-10-CM

## 2023-06-27 DIAGNOSIS — R03.0 ELEVATED BLOOD PRESSURE READING: Primary | ICD-10-CM

## 2023-06-27 PROCEDURE — 99214 OFFICE O/P EST MOD 30 MIN: CPT | Mod: ,,, | Performed by: FAMILY MEDICINE

## 2023-06-27 PROCEDURE — 3075F PR MOST RECENT SYSTOLIC BLOOD PRESS GE 130-139MM HG: ICD-10-PCS | Mod: CPTII,,, | Performed by: FAMILY MEDICINE

## 2023-06-27 PROCEDURE — 3079F DIAST BP 80-89 MM HG: CPT | Mod: CPTII,,, | Performed by: FAMILY MEDICINE

## 2023-06-27 PROCEDURE — 1159F PR MEDICATION LIST DOCUMENTED IN MEDICAL RECORD: ICD-10-PCS | Mod: CPTII,,, | Performed by: FAMILY MEDICINE

## 2023-06-27 PROCEDURE — 3079F PR MOST RECENT DIASTOLIC BLOOD PRESSURE 80-89 MM HG: ICD-10-PCS | Mod: CPTII,,, | Performed by: FAMILY MEDICINE

## 2023-06-27 PROCEDURE — 99214 PR OFFICE/OUTPT VISIT, EST, LEVL IV, 30-39 MIN: ICD-10-PCS | Mod: ,,, | Performed by: FAMILY MEDICINE

## 2023-06-27 PROCEDURE — 3075F SYST BP GE 130 - 139MM HG: CPT | Mod: CPTII,,, | Performed by: FAMILY MEDICINE

## 2023-06-27 PROCEDURE — 1160F RVW MEDS BY RX/DR IN RCRD: CPT | Mod: CPTII,,, | Performed by: FAMILY MEDICINE

## 2023-06-27 PROCEDURE — 3008F PR BODY MASS INDEX (BMI) DOCUMENTED: ICD-10-PCS | Mod: CPTII,,, | Performed by: FAMILY MEDICINE

## 2023-06-27 PROCEDURE — 1159F MED LIST DOCD IN RCRD: CPT | Mod: CPTII,,, | Performed by: FAMILY MEDICINE

## 2023-06-27 PROCEDURE — 1160F PR REVIEW ALL MEDS BY PRESCRIBER/CLIN PHARMACIST DOCUMENTED: ICD-10-PCS | Mod: CPTII,,, | Performed by: FAMILY MEDICINE

## 2023-06-27 PROCEDURE — 3008F BODY MASS INDEX DOCD: CPT | Mod: CPTII,,, | Performed by: FAMILY MEDICINE

## 2023-06-27 NOTE — PROGRESS NOTES
Patient ID: [id number]     Chief Complaint: Follow-up (Discuss labs)      HPI:     Telma Asher is a 42 y.o. female here today for a follow up/discuss lab results.  1) Patient has been monitoring her blood pressure at home-blood pressure has consistently been below 140/90-no symptoms associated with the increased blood pressure.   Past Medical History:   Diagnosis Date    Endometriosis of uterus 2019    Removed at time of etopic pregnancy        Past Surgical History:   Procedure Laterality Date     SECTION      x3- ; ; 2008     SECTION  ,,, 2019     delivers and 1-etopic pregnancy 2019    LYMPH NODE BIOPSY Left 2023        Social History     Tobacco Use    Smoking status: Never    Smokeless tobacco: Never   Substance Use Topics    Alcohol use: Yes     Comment: ocassionally    Drug use: Never        Social History     Socioeconomic History    Marital status:      Spouse name: Bony Coelho Jr.    Number of children: 3        Current Outpatient Medications   Medication Instructions    iron fum-B12-IF-C-folic acid (FOLTRIN) 110-0.5 mg capsule 1 capsule, Oral, 2 times daily    Lactobac no.41/Bifidobact no.7 (PROBIOTIC-10 ORAL) Oral, Nature Target Probiotics. .07oz    mv-min/folic/vit K/lycop/coQ10 (DAILY MULTIVITAMIN ORAL) Oral, [place] light multivitamin    thymol/chlorophyllin (CHLOROPHYLL ORAL) Oral, With Elderberry, ACV, [place], echinacea, and Vitamin D3, C, B12       Review of patient's allergies indicates:  No Known Allergies     Patient Care Team:  Sandra Love MD as PCP - General (Family Medicine)  Inez Mcclendon LPN as Care Coordinator  Mile Akbar RN as Registered Nurse (Diagnostic Radiology)       Subjective:     Review of Systems    12 point review of systems conducted, negative except as stated in the history of present illness. See HPI for details.      Objective:     Visit Vitals  /88 (BP Location: Left arm, Patient  "Position: Sitting)   Pulse 85   Temp 97.8 °F (36.6 °C)   Ht 5' 9" (1.753 m)   Wt 101.4 kg (223 lb 9.6 oz)   LMP 06/20/2023   SpO2 100%   BMI 33.02 kg/m²       Physical Exam    General: Alert and oriented, No acute distress.  Head: Normocephalic, Atraumatic.  Eye: Pupils are equal, round and reactive to light, Extraocular movements are intact, Sclera non-icteric.  Ears/Nose/Throat: Normal, Mucosa moist,Clear.  Neck/Thyroid: Supple, Non-tender, No lymphadenopathy  Respiratory: Clear to auscultation bilaterally; No wheezes, rales or rhonchi  Cardiovascular: Regular rate and rhythm,  Gastrointestinal: Soft, Non-tender, Non-distended, Normal bowel sounds, No palpable organomegaly.  Musculoskeletal: Normal range of motion.  Integumentary: Warm, Dry, Intact  Extremities: No clubbing, cyanosis or edema  Neurologic: No focal deficits, Cranial Nerves II-XII are grossly intact  Psychiatric: Normal interaction, Coherent speech, Euthymic mood, Appropriate affect       Assessment:       ICD-10-CM ICD-9-CM   1. Elevated blood pressure reading  R03.0 796.2   2. Encounter to discuss test results  Z71.2 V65.49   3. Iron deficiency anemia due to chronic blood loss  D50.0 280.0        Plan:     1. Elevated blood pressure reading  Patient to continue to monitor blood pressure at home-goal of blood pressure at home discussed.  If blood pressure is consistently above 140/90 patient needs to make appointment.  Continue to encourage diet and lifestyle modifications.     2. Encounter to discuss test results  Abnormal Lab Results: CMP/CBC/HGBA1C/TSH/FLP/UA/ Hepatitis-C/HIV. Discussed.     3. Iron deficiency anemia due to chronic blood loss  Pathophysiology/Treatment/ Dangers Discussed.  Check labs in 3 months management based on finding.    - CBC Auto Differential; Future  - Path Review, Peripheral Smear; Future  - iron fum-B12-IF-C-folic acid (FOLTRIN) 110-0.5 mg capsule; Take 1 capsule by mouth 2 (two) times daily.  Dispense: 60 capsule; " Refill: 11   - Ferritin; Future  - Iron and TIBC; Future      Follow up in 3 months (on 9/27/2023), or if symptoms worsen or fail to improve, for Follow Up. In addition to their scheduled follow up, the patient has also been instructed to follow up on as needed basis.     Future Appointments   Date Time Provider Department Center   7/17/2023  8:00 AM [address]   7/17/2023  8:20 AM [address]   7/25/2023 11:40 AM Sherri Nunes MD [facility]   10/9/2023  1:15 PM Sandra Love MD [facility] Love MD

## 2023-07-24 NOTE — PROGRESS NOTES
[facility] Breast Surg  Breast Surgical Oncology  Follow-Up Patient Office Visit       Referring Provider: No ref. provider found  PCP: Sandra Love MD   Medical Oncologist: No care team member to display   Radiation Oncologist: No care team member to display   Other Care Providers:     Chief Complaint:   Chief Complaint   Patient presents with    Follow-up     Patient is present for a follow up for Bilateral Lymphadenopathy.        Subjective:   Treatment History:  None      Interval History:  2023 - Telma Asher presents today for follow up after repeat imaging.    HPI:  Telma Asher is a 42 y.o. female who presents on 2023 for evaluation of  left axillary adenopathy . She has a history of developing cold like symptoms (mostly consisting of cough, sore throat and sinus congestions) just before presenting for her screening mammogram on 2023. She subsequently found out her daughter had Covid and her symptoms worsened between the time of her additional views and her biopsy. The day after her biopsy she presented to the urgent care where they tested her for RSV (per patient - don't see in care everywhere records) and Covid. She was negative Covid and positive for RSV (per report). She was not given any medications, but conservative measures were discussed. She has since resolved. She also has a negative review of symptoms as listed below.     A detailed patient history was obtained and reviewed. She currently denies any breast issues including rashes, redness, pain, swelling, nipple discharge, or new lumps/masses.     MG breast density: Category B (scattered areas of fibroglandular tissue)      Imagin2023 BL SC MG at [facility] - which revealed in L breast  a focal asymmetry in the upper outer quadrant posterior depth.  There are lymph nodes with apparent cortical thickening within bilateral axillae. BIRADS-0;additional imaging needed.    5/15/2023 L DG  MG/US L BREAST LIMITED/R US AXILLA at [facility] - which revealed on   L MG the focal asymmetry in the upper-outer quadrant posterior depth  effaces into normal fibroglandular tissue.  The mammographic view of the L axilla demonstrates apparent cortical thickening of the axillary lymph nodes within the field of view. No other significant mammographic finding in the left breast and axilla.   L US - no suspicious sonographic finding in the upper-outer quadrant.  Along the left breast 1 o'clock - 2 o'clock area 10-12 cm FN, there is only normal fibroductoglandular tissue, correlating with the focal asymmetry recalled from screening mammography. In the level 1 steven stations of both axillae, there are morphologically abnormal lymph nodes with uniform cortical thickening (up to 0.6 cm) and partial to complete effacement of their fatty funmi.  BIRADS-4 suspicious;biopsy recommended.    3. 7/25/2023 BL US AXILLA at [facility] - which revealed persistent enlarged of bilateral axillary lymph nodes (final report still pending)        Pathology:  5/24/2023 Ultrasound-guided Core Needle Biopsy Left Axilla - Lymph node with small CD 19, CD 20, and CD 10 positive, Kappa-restricted B cell population as tested by flow cytometry  IMMUNOHISTOCHEMICAL STAINS:   CD20 AND BCL-2: POSITIVE IN B LYMPHOCYTES.   CD3, CD5 AND CD23: POSITIVE IN T LYMPHOCYTES.   BCL-6 AND CD10: NEGATIVE.   KAPPA AND LAMBDA: INCONCLUSIVE.   KI-67: SHOWS NUCLEAR POSITIVITY IN 3-4% OF LYMPHOCYTES  Note - Morphologic and immunohistochemical analysis does not demonstrate evidence of lymphoma. The significance of the population identified by flow cytometry is unclear and may be due to sampling or oligoclonal reactive process, among others. Recommend excision for further evaluation.     OB/GYN History:  Age at Menarche Onset: 13  Menopausal Status: premenopausal, LMP: Patient's last menstrual period was 05/28/2023 (exact date).  Hysterectomy/Oophorectomy: NA, at age NA (one  ovary removed)  Hormonal birth control (duration): Yes OCP (estrogen/progesterone).   Pregnancy History:   Age at first live birth: 20  Hormone Replacement Therapy: No, none  Patient did not breast feed.  Patient denies nipple discharge.   Patient denies to previous breast biopsy.   Patient denies to a personal history of breast cancer.     Other Relevant History:  Prior thoracic RT: none  Genetic testing: No  Ashkenazi Mandaeism descent: No       Family History:  Family History   Problem Relation Age of Onset    No Known Problems Mother     No Known Problems Father     Hypertension Maternal Grandmother     No Known Problems Paternal Grandmother     No Known Problems Paternal Grandfather         Past History:  Past Medical History:   Diagnosis Date    Endometriosis of uterus 2019    Removed at time of etopic pregnancy        Past Surgical History:   Procedure Laterality Date     SECTION      x3- ; ; 2008     SECTION  ,,, 2019     delivers and 1-etopic pregnancy 2019    LYMPH NODE BIOPSY Left 2023        Social History     Socioeconomic History    Marital status:      Spouse name: Bony Coelho Jr.    Number of children: 3   Occupational History    Occupation: [employer]:    Tobacco Use    Smoking status: Never    Smokeless tobacco: Never   Substance and Sexual Activity    Alcohol use: Yes     Comment: ocassionally    Drug use: Never    Sexual activity: Yes     Partners: Male     Birth control/protection: None        Body mass index is 32.93 kg/m².     Allergy/Medications:   Review of patient's allergies indicates:  No Known Allergies       Current Outpatient Medications:     iron fum-B12-IF-C-folic acid (FOLTRIN) 110-0.5 mg capsule, Take 1 capsule by mouth 2 (two) times daily., Disp: 60 capsule, Rfl: 11    Lactobac no.41/Bifidobact no.7 (PROBIOTIC-10 ORAL), Take by mouth. Nature Target Probiotics. .07oz, Disp: , Rfl:     mv-min/folic/vit  "K/lycop/coQ10 (DAILY MULTIVITAMIN ORAL), Take by mouth. [place] light multivitamin, Disp: , Rfl:     thymol/chlorophyllin (CHLOROPHYLL ORAL), Take by mouth. With Elderberry, ACV, [place], echinacea, and Vitamin D3, C, B12, Disp: , Rfl:        Review of Systems:  Review of Systems   Constitutional:  Negative for chills, fever and weight loss.   HENT:  Negative for sore throat.    Eyes:  Negative for blurred vision and double vision.   Respiratory:  Negative for cough and shortness of breath.    Cardiovascular:  Negative for chest pain, palpitations and leg swelling.   Gastrointestinal:  Negative for abdominal pain, constipation, diarrhea, heartburn, nausea and vomiting.   Genitourinary:  Negative for dysuria, flank pain, frequency, hematuria and urgency.   Musculoskeletal:  Negative for back pain, joint pain and myalgias.   Neurological:  Negative for dizziness and headaches.   Endo/Heme/Allergies:  Does not bruise/bleed easily.   Psychiatric/Behavioral:  Negative for depression. The patient is not nervous/anxious.          Objective:     Vitals:  Blood pressure 134/87, pulse 76, temperature 98.3 °F (36.8 °C), temperature source Oral, resp. rate 18, height 5' 9" (1.753 m), weight 101.2 kg (223 lb), last menstrual period 07/12/2023, SpO2 100 %.      Physical Exam:  General: The patient is awake, alert and oriented times three. The patient is well nourished and in no acute distress.   Neck: There is no evidence of palpable cervical, supraclavicular or axillary adenopathy. The neck is supple. The thyroid is not enlarged.   Musculoskeletal: The patient has a normal range of motion of her bilateral upper extremities.   Chest: Examination of the chest wall fails to reveal any obvious abnormalities. The lungs are clear to auscultation bilaterally without rales, rhonchi, or wheezing.   Cardiovascular: The heart has a regular rate and rhythm without murmurs, gallops or rubs.   Breast:   Right: Examination of right breast " fails to reveal any dominant masses or areas of significant focal nodularity. The nipple is everted without evidence of discharge. There is no skin dimpling with movement of the pectoralis. There is no significant skin changes overlying the breast.   Left: Examination of the left breast fails to reveal any dominant masses or areas of significant focal nodularity. The nipple is everted without evidence of discharge. There is no skin dimpling with movement of the pectoralis. There is no significant skin changes overlying the breast.  Abdomen: The abdomen is soft, flat, nontender and nondistended with no palpable masses or organomegaly.  Integumentary: no rashes or skin lesions present  Neurologic: cranial nerves intact, no signs of peripheral neurological deficit, motor/sensory function intact    Assessment and Plan:     Encounter Diagnoses   Name Primary?    Axillary lymphadenopathy Yes    Lymphoma of lymph nodes of axilla, unspecified lymphoma type         We discussed her imaging completed today, which revealed persistent enlargement of bilateral axillary lymph nodes.    We discussed the recommendation to proceed with surgical excision of the previously biopsied left axillary lymph node for re-evaluation with flow cytometric analysis.     We discussed the details of surgical excision and the recommendation for seed localization of the left axillary lymph node prior to surgery. Surgical excision of left axillary lymph node can be done on outpatient basis under local anesthesia and sedation or general anesthesia. The risks and complications of pain, bleeding, infection, hematoma, seroma, additional surgery, and scarring were explained.      Her imaging was discussed with Dr. Amaya. We discussed the presences of a larger lymph nodes adjacent to the smaller lymph node biopsied. Recommend seed localization of which prior biopsied node. Im certain once present in the axilla I will be able to see and/or palpate the  larger lymph node.     Plan:     Problem List Items Addressed This Visit          Other    Axillary lymphadenopathy - Primary    Current Assessment & Plan     1. Surgery - Excisional biopsy of left axillary lymph node  Tentative Date: 8/7/2023  2. Preop - CBC with diff  3. Surgical instructions and information were discussed in detail         Relevant Orders    Case Request Operating Room: EXCISION, LYMPH NODE,USING RADIOLOGICAL MARKER - [facility] Localization (Completed)    Basic metabolic panel    CBC auto differential    POCT urine pregnancy     Other Visit Diagnoses       Lymphoma of lymph nodes of axilla, unspecified lymphoma type        Relevant Orders    Case Request Operating Room: EXCISION, LYMPH NODE,USING RADIOLOGICAL MARKER - [facility] Localization (Completed)    Basic metabolic panel    CBC auto differential    POCT urine pregnancy                All of questions were answered    Sherri Nunes MD  Breast Surgical Oncology     OFFICE VISIT CODING:    Non-face-to-face time included:  Yes Preparing to see the patient such as reviewing the patient record  Yes Obtaining and reviewing separately obtained history  Yes Independently interpreting results  Yes Documenting clinical information in electronic health record  No Ordering appropriate medications  Yes Ordering appropriate tests  Yes Ordering appropriate procedures (including follow-up)  Yes Referring and communicating with other health care professionals (not separately reported)  Yes Care Coordination (not separately reported)    Face-to-face time included:  Yes Performing a medically necessary appropriate history, examination, and/or evaluation  Yes Communicating results to the patient/family/caregiver  Yes Counseling and educating the patient/family/caregiver  Yes Answering patient/family/caregiver questions    Total Time: 41 minutes    Total time includes both face-to-face and non-face-to-face time personally spent by myself on the day of the visit.

## 2023-07-24 NOTE — H&P (VIEW-ONLY)
[facility] Breast Surg  Breast Surgical Oncology  Follow-Up Patient Office Visit       Referring Provider: No ref. provider found  PCP: Sandra Love MD   Medical Oncologist: No care team member to display   Radiation Oncologist: No care team member to display   Other Care Providers:     Chief Complaint:   Chief Complaint   Patient presents with    Follow-up     Patient is present for a follow up for Bilateral Lymphadenopathy.        Subjective:   Treatment History:  None      Interval History:  2023 - Telma Asher presents today for follow up after repeat imaging.    HPI:  Telma Asher is a 42 y.o. female who presents on 2023 for evaluation of  left axillary adenopathy . She has a history of developing cold like symptoms (mostly consisting of cough, sore throat and sinus congestions) just before presenting for her screening mammogram on 2023. She subsequently found out her daughter had Covid and her symptoms worsened between the time of her additional views and her biopsy. The day after her biopsy she presented to the urgent care where they tested her for RSV (per patient - don't see in care everywhere records) and Covid. She was negative Covid and positive for RSV (per report). She was not given any medications, but conservative measures were discussed. She has since resolved. She also has a negative review of symptoms as listed below.     A detailed patient history was obtained and reviewed. She currently denies any breast issues including rashes, redness, pain, swelling, nipple discharge, or new lumps/masses.     MG breast density: Category B (scattered areas of fibroglandular tissue)      Imagin2023 BL SC MG at [facility] - which revealed in L breast  a focal asymmetry in the upper outer quadrant posterior depth.  There are lymph nodes with apparent cortical thickening within bilateral axillae. BIRADS-0;additional imaging needed.    5/15/2023 L DG  MG/US L BREAST LIMITED/R US AXILLA at [facility] - which revealed on   L MG the focal asymmetry in the upper-outer quadrant posterior depth  effaces into normal fibroglandular tissue.  The mammographic view of the L axilla demonstrates apparent cortical thickening of the axillary lymph nodes within the field of view. No other significant mammographic finding in the left breast and axilla.   L US - no suspicious sonographic finding in the upper-outer quadrant.  Along the left breast 1 o'clock - 2 o'clock area 10-12 cm FN, there is only normal fibroductoglandular tissue, correlating with the focal asymmetry recalled from screening mammography. In the level 1 steven stations of both axillae, there are morphologically abnormal lymph nodes with uniform cortical thickening (up to 0.6 cm) and partial to complete effacement of their fatty funmi.  BIRADS-4 suspicious;biopsy recommended.    3. 7/25/2023 BL US AXILLA at [facility] - which revealed persistent enlarged of bilateral axillary lymph nodes (final report still pending)        Pathology:  5/24/2023 Ultrasound-guided Core Needle Biopsy Left Axilla - Lymph node with small CD 19, CD 20, and CD 10 positive, Kappa-restricted B cell population as tested by flow cytometry  IMMUNOHISTOCHEMICAL STAINS:   CD20 AND BCL-2: POSITIVE IN B LYMPHOCYTES.   CD3, CD5 AND CD23: POSITIVE IN T LYMPHOCYTES.   BCL-6 AND CD10: NEGATIVE.   KAPPA AND LAMBDA: INCONCLUSIVE.   KI-67: SHOWS NUCLEAR POSITIVITY IN 3-4% OF LYMPHOCYTES  Note - Morphologic and immunohistochemical analysis does not demonstrate evidence of lymphoma. The significance of the population identified by flow cytometry is unclear and may be due to sampling or oligoclonal reactive process, among others. Recommend excision for further evaluation.     OB/GYN History:  Age at Menarche Onset: 13  Menopausal Status: premenopausal, LMP: Patient's last menstrual period was 05/28/2023 (exact date).  Hysterectomy/Oophorectomy: NA, at age NA (one  ovary removed)  Hormonal birth control (duration): Yes OCP (estrogen/progesterone).   Pregnancy History:   Age at first live birth: 20  Hormone Replacement Therapy: No, none  Patient did not breast feed.  Patient denies nipple discharge.   Patient denies to previous breast biopsy.   Patient denies to a personal history of breast cancer.     Other Relevant History:  Prior thoracic RT: none  Genetic testing: No  Ashkenazi Bahai descent: No       Family History:  Family History   Problem Relation Age of Onset    No Known Problems Mother     No Known Problems Father     Hypertension Maternal Grandmother     No Known Problems Paternal Grandmother     No Known Problems Paternal Grandfather         Past History:  Past Medical History:   Diagnosis Date    Endometriosis of uterus 2019    Removed at time of etopic pregnancy        Past Surgical History:   Procedure Laterality Date     SECTION      x3- ; ; 2008     SECTION  ,,, 2019     delivers and 1-etopic pregnancy 2019    LYMPH NODE BIOPSY Left 2023        Social History     Socioeconomic History    Marital status:      Spouse name: Bony Coelho Jr.    Number of children: 3   Occupational History    Occupation: [employer]:    Tobacco Use    Smoking status: Never    Smokeless tobacco: Never   Substance and Sexual Activity    Alcohol use: Yes     Comment: ocassionally    Drug use: Never    Sexual activity: Yes     Partners: Male     Birth control/protection: None        Body mass index is 32.93 kg/m².     Allergy/Medications:   Review of patient's allergies indicates:  No Known Allergies       Current Outpatient Medications:     iron fum-B12-IF-C-folic acid (FOLTRIN) 110-0.5 mg capsule, Take 1 capsule by mouth 2 (two) times daily., Disp: 60 capsule, Rfl: 11    Lactobac no.41/Bifidobact no.7 (PROBIOTIC-10 ORAL), Take by mouth. Nature Target Probiotics. .07oz, Disp: , Rfl:     mv-min/folic/vit  "K/lycop/coQ10 (DAILY MULTIVITAMIN ORAL), Take by mouth. Termo light multivitamin, Disp: , Rfl:     thymol/chlorophyllin (CHLOROPHYLL ORAL), Take by mouth. With Elderberry, ACV, [place], echinacea, and Vitamin D3, C, B12, Disp: , Rfl:        Review of Systems:  Review of Systems   Constitutional:  Negative for chills, fever and weight loss.   HENT:  Negative for sore throat.    Eyes:  Negative for blurred vision and double vision.   Respiratory:  Negative for cough and shortness of breath.    Cardiovascular:  Negative for chest pain, palpitations and leg swelling.   Gastrointestinal:  Negative for abdominal pain, constipation, diarrhea, heartburn, nausea and vomiting.   Genitourinary:  Negative for dysuria, flank pain, frequency, hematuria and urgency.   Musculoskeletal:  Negative for back pain, joint pain and myalgias.   Neurological:  Negative for dizziness and headaches.   Endo/Heme/Allergies:  Does not bruise/bleed easily.   Psychiatric/Behavioral:  Negative for depression. The patient is not nervous/anxious.          Objective:     Vitals:  Blood pressure 134/87, pulse 76, temperature 98.3 °F (36.8 °C), temperature source Oral, resp. rate 18, height 5' 9" (1.753 m), weight 101.2 kg (223 lb), last menstrual period 07/12/2023, SpO2 100 %.      Physical Exam:  General: The patient is awake, alert and oriented times three. The patient is well nourished and in no acute distress.   Neck: There is no evidence of palpable cervical, supraclavicular or axillary adenopathy. The neck is supple. The thyroid is not enlarged.   Musculoskeletal: The patient has a normal range of motion of her bilateral upper extremities.   Chest: Examination of the chest wall fails to reveal any obvious abnormalities. The lungs are clear to auscultation bilaterally without rales, rhonchi, or wheezing.   Cardiovascular: The heart has a regular rate and rhythm without murmurs, gallops or rubs.   Breast:   Right: Examination of right breast " fails to reveal any dominant masses or areas of significant focal nodularity. The nipple is everted without evidence of discharge. There is no skin dimpling with movement of the pectoralis. There is no significant skin changes overlying the breast.   Left: Examination of the left breast fails to reveal any dominant masses or areas of significant focal nodularity. The nipple is everted without evidence of discharge. There is no skin dimpling with movement of the pectoralis. There is no significant skin changes overlying the breast.  Abdomen: The abdomen is soft, flat, nontender and nondistended with no palpable masses or organomegaly.  Integumentary: no rashes or skin lesions present  Neurologic: cranial nerves intact, no signs of peripheral neurological deficit, motor/sensory function intact    Assessment and Plan:     Encounter Diagnoses   Name Primary?    Axillary lymphadenopathy Yes    Lymphoma of lymph nodes of axilla, unspecified lymphoma type         We discussed her imaging completed today, which revealed persistent enlargement of bilateral axillary lymph nodes.    We discussed the recommendation to proceed with surgical excision of the previously biopsied left axillary lymph node for re-evaluation with flow cytometric analysis.     We discussed the details of surgical excision and the recommendation for seed localization of the left axillary lymph node prior to surgery. Surgical excision of left axillary lymph node can be done on outpatient basis under local anesthesia and sedation or general anesthesia. The risks and complications of pain, bleeding, infection, hematoma, seroma, additional surgery, and scarring were explained.      Her imaging was discussed with Dr. Amaya. We discussed the presences of a larger lymph nodes adjacent to the smaller lymph node biopsied. Recommend seed localization of which prior biopsied node. Im certain once present in the axilla I will be able to see and/or palpate the  larger lymph node.     Plan:     Problem List Items Addressed This Visit          Other    Axillary lymphadenopathy - Primary    Current Assessment & Plan     1. Surgery - Excisional biopsy of left axillary lymph node  Tentative Date: 8/7/2023  2. Preop - CBC with diff  3. Surgical instructions and information were discussed in detail         Relevant Orders    Case Request Operating Room: EXCISION, LYMPH NODE,USING RADIOLOGICAL MARKER - Magseed Localization (Completed)    Basic metabolic panel    CBC auto differential    POCT urine pregnancy     Other Visit Diagnoses       Lymphoma of lymph nodes of axilla, unspecified lymphoma type        Relevant Orders    Case Request Operating Room: EXCISION, LYMPH NODE,USING RADIOLOGICAL MARKER - Magseed Localization (Completed)    Basic metabolic panel    CBC auto differential    POCT urine pregnancy                All of questions were answered    Sherri Nunes MD  Breast Surgical Oncology     OFFICE VISIT CODING:    Non-face-to-face time included:  Yes Preparing to see the patient such as reviewing the patient record  Yes Obtaining and reviewing separately obtained history  Yes Independently interpreting results  Yes Documenting clinical information in electronic health record  No Ordering appropriate medications  Yes Ordering appropriate tests  Yes Ordering appropriate procedures (including follow-up)  Yes Referring and communicating with other health care professionals (not separately reported)  Yes Care Coordination (not separately reported)    Face-to-face time included:  Yes Performing a medically necessary appropriate history, examination, and/or evaluation  Yes Communicating results to the patient/family/caregiver  Yes Counseling and educating the patient/family/caregiver  Yes Answering patient/family/caregiver questions    Total Time: 41 minutes    Total time includes both face-to-face and non-face-to-face time personally spent by myself on the day of the visit.

## 2023-07-25 ENCOUNTER — HOSPITAL ENCOUNTER (OUTPATIENT)
Dept: RADIOLOGY | Facility: HOSPITAL | Age: 43
Discharge: HOME | End: 2023-07-25
Attending: SURGERY
Payer: COMMERCIAL

## 2023-07-25 ENCOUNTER — OFFICE VISIT (OUTPATIENT)
Dept: SURGERY | Facility: CLINIC | Age: 43
End: 2023-07-25
Payer: COMMERCIAL

## 2023-07-25 VITALS
HEART RATE: 76 BPM | OXYGEN SATURATION: 100 % | DIASTOLIC BLOOD PRESSURE: 87 MMHG | HEIGHT: 69 IN | WEIGHT: 223 LBS | RESPIRATION RATE: 18 BRPM | BODY MASS INDEX: 33.03 KG/M2 | TEMPERATURE: 98 F | SYSTOLIC BLOOD PRESSURE: 134 MMHG

## 2023-07-25 DIAGNOSIS — C85.94 LYMPHOMA OF LYMPH NODES OF AXILLA, UNSPECIFIED LYMPHOMA TYPE: ICD-10-CM

## 2023-07-25 DIAGNOSIS — R59.0 AXILLARY LYMPHADENOPATHY: ICD-10-CM

## 2023-07-25 DIAGNOSIS — R59.0 AXILLARY LYMPHADENOPATHY: Primary | ICD-10-CM

## 2023-07-25 PROCEDURE — 3008F BODY MASS INDEX DOCD: CPT | Mod: CPTII,S$GLB,, | Performed by: SURGERY

## 2023-07-25 PROCEDURE — 3075F PR MOST RECENT SYSTOLIC BLOOD PRESS GE 130-139MM HG: ICD-10-PCS | Mod: CPTII,S$GLB,, | Performed by: SURGERY

## 2023-07-25 PROCEDURE — 76882 US AXILLA ONLY (BREAST IMAGING) LEFT: ICD-10-PCS | Mod: 26,LT,, | Performed by: STUDENT IN AN ORGANIZED HEALTH CARE EDUCATION/TRAINING PROGRAM

## 2023-07-25 PROCEDURE — 3075F SYST BP GE 130 - 139MM HG: CPT | Mod: CPTII,S$GLB,, | Performed by: SURGERY

## 2023-07-25 PROCEDURE — 99215 PR OFFICE/OUTPT VISIT, EST, LEVL V, 40-54 MIN: ICD-10-PCS | Mod: S$GLB,,, | Performed by: SURGERY

## 2023-07-25 PROCEDURE — 99999 PR PBB SHADOW E&M-EST. PATIENT-LVL V: CPT | Mod: PBBFAC,,, | Performed by: SURGERY

## 2023-07-25 PROCEDURE — 76882 US LMTD JT/FCL EVL NVASC XTR: CPT | Mod: TC,RT

## 2023-07-25 PROCEDURE — 3079F PR MOST RECENT DIASTOLIC BLOOD PRESSURE 80-89 MM HG: ICD-10-PCS | Mod: CPTII,S$GLB,, | Performed by: SURGERY

## 2023-07-25 PROCEDURE — 3044F PR MOST RECENT HEMOGLOBIN A1C LEVEL <7.0%: ICD-10-PCS | Mod: CPTII,S$GLB,, | Performed by: SURGERY

## 2023-07-25 PROCEDURE — 99999 PR PBB SHADOW E&M-EST. PATIENT-LVL V: ICD-10-PCS | Mod: PBBFAC,,, | Performed by: SURGERY

## 2023-07-25 PROCEDURE — 3079F DIAST BP 80-89 MM HG: CPT | Mod: CPTII,S$GLB,, | Performed by: SURGERY

## 2023-07-25 PROCEDURE — 3044F HG A1C LEVEL LT 7.0%: CPT | Mod: CPTII,S$GLB,, | Performed by: SURGERY

## 2023-07-25 PROCEDURE — 76882 US LMTD JT/FCL EVL NVASC XTR: CPT | Mod: 26,RT,, | Performed by: STUDENT IN AN ORGANIZED HEALTH CARE EDUCATION/TRAINING PROGRAM

## 2023-07-25 PROCEDURE — 76882 US AXILLA ONLY (BREAST IMAGING) RIGHT: ICD-10-PCS | Mod: 26,RT,, | Performed by: STUDENT IN AN ORGANIZED HEALTH CARE EDUCATION/TRAINING PROGRAM

## 2023-07-25 PROCEDURE — 1160F PR REVIEW ALL MEDS BY PRESCRIBER/CLIN PHARMACIST DOCUMENTED: ICD-10-PCS | Mod: CPTII,S$GLB,, | Performed by: SURGERY

## 2023-07-25 PROCEDURE — 76882 US LMTD JT/FCL EVL NVASC XTR: CPT | Mod: TC,LT

## 2023-07-25 PROCEDURE — 1159F MED LIST DOCD IN RCRD: CPT | Mod: CPTII,S$GLB,, | Performed by: SURGERY

## 2023-07-25 PROCEDURE — 99215 OFFICE O/P EST HI 40 MIN: CPT | Mod: S$GLB,,, | Performed by: SURGERY

## 2023-07-25 PROCEDURE — 3008F PR BODY MASS INDEX (BMI) DOCUMENTED: ICD-10-PCS | Mod: CPTII,S$GLB,, | Performed by: SURGERY

## 2023-07-25 PROCEDURE — 1160F RVW MEDS BY RX/DR IN RCRD: CPT | Mod: CPTII,S$GLB,, | Performed by: SURGERY

## 2023-07-25 PROCEDURE — 1159F PR MEDICATION LIST DOCUMENTED IN MEDICAL RECORD: ICD-10-PCS | Mod: CPTII,S$GLB,, | Performed by: SURGERY

## 2023-07-25 PROCEDURE — 76882 US LMTD JT/FCL EVL NVASC XTR: CPT | Mod: 26,LT,, | Performed by: STUDENT IN AN ORGANIZED HEALTH CARE EDUCATION/TRAINING PROGRAM

## 2023-07-25 NOTE — Clinical Note
I want to send this patient to you. She has some abnormal labs dating back to 2019. No labs in between. Now with bilateral axillary adenopathy, history of RSV (per patietn), and persistent on repeat imaging.   Core needle biopsy performed concerning for lymphoma. Doing an excisional biopsy to repeat flow cytometry on 8/7. Do you see any issues  or would you recommend any thing prior to excisional biopsy of the left lymph node?

## 2023-07-25 NOTE — ASSESSMENT & PLAN NOTE
1. Surgery - Excisional biopsy of left axillary lymph node  Tentative Date: 8/7/2023  2. Preop - CBC with diff  3. Surgical instructions and information were discussed in detail  4. Recommend Medical Oncology Referral - sent communication to Dr. Gomez

## 2023-07-26 RX ORDER — SODIUM CHLORIDE, SODIUM LACTATE, POTASSIUM CHLORIDE, CALCIUM CHLORIDE 600; 310; 30; 20 MG/100ML; MG/100ML; MG/100ML; MG/100ML
INJECTION, SOLUTION INTRAVENOUS CONTINUOUS
Status: CANCELLED | OUTPATIENT
Start: 2023-07-26

## 2023-07-27 DIAGNOSIS — R59.0 AXILLARY LYMPHADENOPATHY: Primary | ICD-10-CM

## 2023-08-02 ENCOUNTER — HOSPITAL ENCOUNTER (OUTPATIENT)
Dept: RADIOLOGY | Facility: HOSPITAL | Age: 43
Discharge: HOME | End: 2023-08-02
Attending: SURGERY
Payer: COMMERCIAL

## 2023-08-02 DIAGNOSIS — R59.0 AXILLARY LYMPHADENOPATHY: ICD-10-CM

## 2023-08-02 PROCEDURE — 19285 PERQ DEV BREAST 1ST US IMAG: CPT | Mod: LT,,, | Performed by: RADIOLOGY

## 2023-08-02 PROCEDURE — 19285 US LEFT MAGSEED LOCALIZATION (BREAST IMAGING): ICD-10-PCS | Mod: LT,,, | Performed by: RADIOLOGY

## 2023-08-02 PROCEDURE — 77061 MAMMO DIGITAL DIAGNOSTIC LEFT WITH TOMO: ICD-10-PCS | Mod: 26,LT,, | Performed by: RADIOLOGY

## 2023-08-02 PROCEDURE — 77061 BREAST TOMOSYNTHESIS UNI: CPT | Mod: 26,LT,, | Performed by: RADIOLOGY

## 2023-08-02 PROCEDURE — 77065 MAMMO DIGITAL DIAGNOSTIC LEFT WITH TOMO: ICD-10-PCS | Mod: 26,LT,, | Performed by: RADIOLOGY

## 2023-08-02 PROCEDURE — 77061 BREAST TOMOSYNTHESIS UNI: CPT | Mod: TC,LT

## 2023-08-02 PROCEDURE — 19285 PERQ DEV BREAST 1ST US IMAG: CPT | Mod: LT

## 2023-08-02 PROCEDURE — 77065 DX MAMMO INCL CAD UNI: CPT | Mod: 26,LT,, | Performed by: RADIOLOGY

## 2023-08-03 ENCOUNTER — TELEPHONE (OUTPATIENT)
Dept: SURGERY | Facility: CLINIC | Age: 43
End: 2023-08-03
Payer: COMMERCIAL

## 2023-08-03 NOTE — TELEPHONE ENCOUNTER
Called patient to discuss her lab results.  Patient states that she has been taking a multivitamin, and was recently prescribed iron with Vit B 12 and folate in it.  She said she realized last night after looking at her results to check her medications.  She realized after looking at meds that she was taking too much. She states that she is stopping the multivitamin, and taking the iron supplement.            ----- Message from Sherri Nunes MD sent at 8/3/2023  9:55 AM CDT -----  Lab results reviewed. She has elevated levels of Vitamin B12 and Folate. She has some abnormalities in her blood smear. Case please touch base with her to evaluate for use of B12 and Folic Acid (folate) supplements. Does she take any multivitamins, etc.    But overall okay to proceed with surgical excision of lymph node.

## 2023-08-06 ENCOUNTER — ANESTHESIA EVENT (OUTPATIENT)
Dept: SURGERY | Facility: HOSPITAL | Age: 43
End: 2023-08-06
Payer: COMMERCIAL

## 2023-08-07 ENCOUNTER — LAB REQUISITION (OUTPATIENT)
Dept: LAB | Facility: HOSPITAL | Age: 43
End: 2023-08-07
Payer: COMMERCIAL

## 2023-08-07 ENCOUNTER — ANESTHESIA (OUTPATIENT)
Dept: SURGERY | Facility: HOSPITAL | Age: 43
End: 2023-08-07
Payer: COMMERCIAL

## 2023-08-07 ENCOUNTER — HOSPITAL ENCOUNTER (OUTPATIENT)
Dept: RADIOLOGY | Facility: HOSPITAL | Age: 43
Discharge: HOME | End: 2023-08-07
Attending: SURGERY
Payer: COMMERCIAL

## 2023-08-07 ENCOUNTER — HOSPITAL ENCOUNTER (OUTPATIENT)
Facility: HOSPITAL | Age: 43
Discharge: HOME | End: 2023-08-07
Attending: SURGERY | Admitting: SURGERY
Payer: COMMERCIAL

## 2023-08-07 VITALS
HEART RATE: 59 BPM | SYSTOLIC BLOOD PRESSURE: 127 MMHG | DIASTOLIC BLOOD PRESSURE: 84 MMHG | HEIGHT: 69 IN | BODY MASS INDEX: 32.95 KG/M2 | WEIGHT: 222.44 LBS | RESPIRATION RATE: 10 BRPM | OXYGEN SATURATION: 96 % | TEMPERATURE: 97 F

## 2023-08-07 DIAGNOSIS — Z98.890 S/P LYMPH NODE BIOPSY: Primary | ICD-10-CM

## 2023-08-07 DIAGNOSIS — R92.8 ABNORMAL MAMMOGRAM: ICD-10-CM

## 2023-08-07 DIAGNOSIS — R59.0 AXILLARY LYMPHADENOPATHY: ICD-10-CM

## 2023-08-07 DIAGNOSIS — C85.94 LYMPHOMA OF LYMPH NODES OF AXILLA, UNSPECIFIED LYMPHOMA TYPE: ICD-10-CM

## 2023-08-07 DIAGNOSIS — R59.9 ENLARGED LYMPH NODES, UNSPECIFIED: ICD-10-CM

## 2023-08-07 LAB
B-HCG UR QL: NEGATIVE
CTP QC/QA: YES

## 2023-08-07 PROCEDURE — 88188 FLOWCYTOMETRY/READ 9-15: CPT | Performed by: SURGERY

## 2023-08-07 PROCEDURE — 25000003 PHARM REV CODE 250: Performed by: SURGERY

## 2023-08-07 PROCEDURE — 63600175 PHARM REV CODE 636 W HCPCS

## 2023-08-07 PROCEDURE — 37000008 HC ANESTHESIA 1ST 15 MINUTES: Performed by: SURGERY

## 2023-08-07 PROCEDURE — 87220 TISSUE EXAM FOR FUNGI: CPT | Performed by: SURGERY

## 2023-08-07 PROCEDURE — 38525 BIOPSY/REMOVAL LYMPH NODES: CPT | Mod: LT,,, | Performed by: SURGERY

## 2023-08-07 PROCEDURE — 76098 X-RAY EXAM SURGICAL SPECIMEN: CPT | Mod: 26,,, | Performed by: RADIOLOGY

## 2023-08-07 PROCEDURE — 76098 MAMMO BREAST SPECIMEN: ICD-10-PCS | Mod: 26,,, | Performed by: RADIOLOGY

## 2023-08-07 PROCEDURE — 27201423 OPTIME MED/SURG SUP & DEVICES STERILE SUPPLY: Performed by: SURGERY

## 2023-08-07 PROCEDURE — 87102 FUNGUS ISOLATION CULTURE: CPT | Performed by: SURGERY

## 2023-08-07 PROCEDURE — 88185 FLOWCYTOMETRY/TC ADD-ON: CPT

## 2023-08-07 PROCEDURE — 71000033 HC RECOVERY, INTIAL HOUR: Performed by: SURGERY

## 2023-08-07 PROCEDURE — 36000707: Performed by: SURGERY

## 2023-08-07 PROCEDURE — 63600175 PHARM REV CODE 636 W HCPCS: Performed by: SURGERY

## 2023-08-07 PROCEDURE — 37000009 HC ANESTHESIA EA ADD 15 MINS: Performed by: SURGERY

## 2023-08-07 PROCEDURE — D9220A PRA ANESTHESIA: Mod: CRNA,,,

## 2023-08-07 PROCEDURE — 76098 X-RAY EXAM SURGICAL SPECIMEN: CPT | Mod: TC

## 2023-08-07 PROCEDURE — 87116 MYCOBACTERIA CULTURE: CPT | Performed by: SURGERY

## 2023-08-07 PROCEDURE — D9220A PRA ANESTHESIA: ICD-10-PCS | Mod: CRNA,,,

## 2023-08-07 PROCEDURE — 81025 URINE PREGNANCY TEST: CPT | Performed by: ANESTHESIOLOGY

## 2023-08-07 PROCEDURE — 88184 FLOWCYTOMETRY/ TC 1 MARKER: CPT

## 2023-08-07 PROCEDURE — 87206 SMEAR FLUORESCENT/ACID STAI: CPT | Performed by: SURGERY

## 2023-08-07 PROCEDURE — 38525 PR BIOPSY/REM LYMPH NODES, AXILLARY: ICD-10-PCS | Mod: LT,,, | Performed by: SURGERY

## 2023-08-07 PROCEDURE — 87075 CULTR BACTERIA EXCEPT BLOOD: CPT | Performed by: SURGERY

## 2023-08-07 PROCEDURE — 87070 CULTURE OTHR SPECIMN AEROBIC: CPT | Performed by: SURGERY

## 2023-08-07 PROCEDURE — 87205 SMEAR GRAM STAIN: CPT | Performed by: SURGERY

## 2023-08-07 PROCEDURE — 25000003 PHARM REV CODE 250

## 2023-08-07 PROCEDURE — A4217 STERILE WATER/SALINE, 500 ML: HCPCS | Performed by: SURGERY

## 2023-08-07 PROCEDURE — D9220A PRA ANESTHESIA: Mod: ANES,,, | Performed by: ANESTHESIOLOGY

## 2023-08-07 PROCEDURE — 71000015 HC POSTOP RECOV 1ST HR: Performed by: SURGERY

## 2023-08-07 PROCEDURE — 63600175 PHARM REV CODE 636 W HCPCS: Performed by: ANESTHESIOLOGY

## 2023-08-07 PROCEDURE — 36000706: Performed by: SURGERY

## 2023-08-07 PROCEDURE — D9220A PRA ANESTHESIA: ICD-10-PCS | Mod: ANES,,, | Performed by: ANESTHESIOLOGY

## 2023-08-07 RX ORDER — MIDAZOLAM HYDROCHLORIDE 1 MG/ML
2 INJECTION INTRAMUSCULAR; INTRAVENOUS ONCE AS NEEDED
Status: COMPLETED | OUTPATIENT
Start: 2023-08-07 | End: 2023-08-07

## 2023-08-07 RX ORDER — METHOCARBAMOL 100 MG/ML
1000 INJECTION, SOLUTION INTRAMUSCULAR; INTRAVENOUS ONCE
Status: COMPLETED | OUTPATIENT
Start: 2023-08-07 | End: 2023-08-07

## 2023-08-07 RX ORDER — SODIUM CHLORIDE 9 MG/ML
INJECTION, SOLUTION INTRAVENOUS CONTINUOUS
Status: DISCONTINUED | OUTPATIENT
Start: 2023-08-07 | End: 2023-08-07 | Stop reason: HOSPADM

## 2023-08-07 RX ORDER — ONDANSETRON HYDROCHLORIDE 2 MG/ML
INJECTION, SOLUTION INTRAMUSCULAR; INTRAVENOUS
Status: DISCONTINUED | OUTPATIENT
Start: 2023-08-07 | End: 2023-08-07

## 2023-08-07 RX ORDER — SODIUM CHLORIDE, SODIUM GLUCONATE, SODIUM ACETATE, POTASSIUM CHLORIDE AND MAGNESIUM CHLORIDE 30; 37; 368; 526; 502 MG/100ML; MG/100ML; MG/100ML; MG/100ML; MG/100ML
INJECTION, SOLUTION INTRAVENOUS CONTINUOUS
Status: DISCONTINUED | OUTPATIENT
Start: 2023-08-07 | End: 2023-08-07 | Stop reason: HOSPADM

## 2023-08-07 RX ORDER — DEXMEDETOMIDINE HYDROCHLORIDE 100 UG/ML
INJECTION, SOLUTION INTRAVENOUS
Status: DISCONTINUED | OUTPATIENT
Start: 2023-08-07 | End: 2023-08-07

## 2023-08-07 RX ORDER — MEPERIDINE HYDROCHLORIDE 25 MG/ML
12.5 INJECTION INTRAMUSCULAR; INTRAVENOUS; SUBCUTANEOUS ONCE
Status: DISCONTINUED | OUTPATIENT
Start: 2023-08-07 | End: 2023-08-07 | Stop reason: HOSPADM

## 2023-08-07 RX ORDER — ACETAMINOPHEN 10 MG/ML
INJECTION, SOLUTION INTRAVENOUS
Status: DISCONTINUED | OUTPATIENT
Start: 2023-08-07 | End: 2023-08-07

## 2023-08-07 RX ORDER — BUPIVACAINE HYDROCHLORIDE 5 MG/ML
INJECTION, SOLUTION EPIDURAL; INTRACAUDAL
Status: DISCONTINUED | OUTPATIENT
Start: 2023-08-07 | End: 2023-08-07 | Stop reason: HOSPADM

## 2023-08-07 RX ORDER — WATER FOR INJECTION,STERILE
VIAL (ML) INJECTION
Status: DISCONTINUED
Start: 2023-08-07 | End: 2023-08-07 | Stop reason: HOSPADM

## 2023-08-07 RX ORDER — TRAMADOL HYDROCHLORIDE 50 MG/1
50 TABLET ORAL EVERY 4 HOURS PRN
Status: DISCONTINUED | OUTPATIENT
Start: 2023-08-07 | End: 2023-08-07 | Stop reason: HOSPADM

## 2023-08-07 RX ORDER — CEFAZOLIN 2 G/1
INJECTION, POWDER, FOR SOLUTION INTRAMUSCULAR; INTRAVENOUS
Status: DISCONTINUED
Start: 2023-08-07 | End: 2023-08-07 | Stop reason: HOSPADM

## 2023-08-07 RX ORDER — SODIUM CHLORIDE, SODIUM LACTATE, POTASSIUM CHLORIDE, CALCIUM CHLORIDE 600; 310; 30; 20 MG/100ML; MG/100ML; MG/100ML; MG/100ML
INJECTION, SOLUTION INTRAVENOUS CONTINUOUS
Status: DISCONTINUED | OUTPATIENT
Start: 2023-08-07 | End: 2023-08-07 | Stop reason: HOSPADM

## 2023-08-07 RX ORDER — CEFAZOLIN SODIUM 1 G/3ML
INJECTION, POWDER, FOR SOLUTION INTRAMUSCULAR; INTRAVENOUS
Status: DISCONTINUED
Start: 2023-08-07 | End: 2023-08-07 | Stop reason: HOSPADM

## 2023-08-07 RX ORDER — CEFAZOLIN SODIUM 1 G/3ML
INJECTION, POWDER, FOR SOLUTION INTRAMUSCULAR; INTRAVENOUS
Status: DISCONTINUED | OUTPATIENT
Start: 2023-08-07 | End: 2023-08-07 | Stop reason: HOSPADM

## 2023-08-07 RX ORDER — METHOCARBAMOL 100 MG/ML
INJECTION, SOLUTION INTRAMUSCULAR; INTRAVENOUS
Status: COMPLETED
Start: 2023-08-07 | End: 2023-08-07

## 2023-08-07 RX ORDER — BUPIVACAINE HYDROCHLORIDE 5 MG/ML
INJECTION, SOLUTION EPIDURAL; INTRACAUDAL
Status: DISCONTINUED
Start: 2023-08-07 | End: 2023-08-07 | Stop reason: HOSPADM

## 2023-08-07 RX ORDER — TRAMADOL HYDROCHLORIDE 50 MG/1
50 TABLET ORAL EVERY 6 HOURS PRN
Qty: 28 TABLET | Refills: 0 | Status: SHIPPED | OUTPATIENT
Start: 2023-08-07 | End: 2023-08-14

## 2023-08-07 RX ORDER — ISOSULFAN BLUE 50 MG/5ML
INJECTION, SOLUTION SUBCUTANEOUS
Status: DISCONTINUED
Start: 2023-08-07 | End: 2023-08-07 | Stop reason: WASHOUT

## 2023-08-07 RX ORDER — WATER 1000 ML/1000ML
INJECTION, SOLUTION INTRAVENOUS
Status: DISCONTINUED | OUTPATIENT
Start: 2023-08-07 | End: 2023-08-07 | Stop reason: HOSPADM

## 2023-08-07 RX ORDER — ONDANSETRON 2 MG/ML
4 INJECTION INTRAMUSCULAR; INTRAVENOUS ONCE
Status: DISCONTINUED | OUTPATIENT
Start: 2023-08-07 | End: 2023-08-07 | Stop reason: HOSPADM

## 2023-08-07 RX ORDER — CEFAZOLIN SODIUM 2 G/50ML
2 SOLUTION INTRAVENOUS
Status: COMPLETED | OUTPATIENT
Start: 2023-08-07 | End: 2023-08-07

## 2023-08-07 RX ORDER — LIDOCAINE HYDROCHLORIDE 10 MG/ML
INJECTION, SOLUTION EPIDURAL; INFILTRATION; INTRACAUDAL; PERINEURAL
Status: DISCONTINUED | OUTPATIENT
Start: 2023-08-07 | End: 2023-08-07

## 2023-08-07 RX ORDER — FENTANYL CITRATE 50 UG/ML
INJECTION, SOLUTION INTRAMUSCULAR; INTRAVENOUS
Status: DISCONTINUED | OUTPATIENT
Start: 2023-08-07 | End: 2023-08-07

## 2023-08-07 RX ORDER — PROPOFOL 10 MG/ML
VIAL (ML) INTRAVENOUS
Status: DISCONTINUED | OUTPATIENT
Start: 2023-08-07 | End: 2023-08-07

## 2023-08-07 RX ORDER — DEXAMETHASONE SODIUM PHOSPHATE 4 MG/ML
INJECTION, SOLUTION INTRA-ARTICULAR; INTRALESIONAL; INTRAMUSCULAR; INTRAVENOUS; SOFT TISSUE
Status: DISCONTINUED | OUTPATIENT
Start: 2023-08-07 | End: 2023-08-07

## 2023-08-07 RX ORDER — HYDROMORPHONE HYDROCHLORIDE 2 MG/ML
0.4 INJECTION, SOLUTION INTRAMUSCULAR; INTRAVENOUS; SUBCUTANEOUS EVERY 5 MIN PRN
Status: DISCONTINUED | OUTPATIENT
Start: 2023-08-07 | End: 2023-08-07 | Stop reason: HOSPADM

## 2023-08-07 RX ORDER — ONDANSETRON 2 MG/ML
4 INJECTION INTRAMUSCULAR; INTRAVENOUS EVERY 12 HOURS PRN
Status: DISCONTINUED | OUTPATIENT
Start: 2023-08-07 | End: 2023-08-07 | Stop reason: HOSPADM

## 2023-08-07 RX ORDER — MIDAZOLAM HYDROCHLORIDE 1 MG/ML
INJECTION INTRAMUSCULAR; INTRAVENOUS
Status: COMPLETED
Start: 2023-08-07 | End: 2023-08-07

## 2023-08-07 RX ADMIN — PROPOFOL 200 MG: 10 INJECTION, EMULSION INTRAVENOUS at 08:08

## 2023-08-07 RX ADMIN — ACETAMINOPHEN 1000 MG: 10 INJECTION, SOLUTION INTRAVENOUS at 08:08

## 2023-08-07 RX ADMIN — METHOCARBAMOL 1000 MG: 100 INJECTION INTRAMUSCULAR; INTRAVENOUS at 09:08

## 2023-08-07 RX ADMIN — DEXMEDETOMIDINE 4 MCG: 200 INJECTION, SOLUTION INTRAVENOUS at 09:08

## 2023-08-07 RX ADMIN — FENTANYL CITRATE 50 MCG: 50 INJECTION, SOLUTION INTRAMUSCULAR; INTRAVENOUS at 08:08

## 2023-08-07 RX ADMIN — CEFAZOLIN SODIUM 2 G: 2 SOLUTION INTRAVENOUS at 08:08

## 2023-08-07 RX ADMIN — METHOCARBAMOL 1000 MG: 100 INJECTION, SOLUTION INTRAMUSCULAR; INTRAVENOUS at 09:08

## 2023-08-07 RX ADMIN — DEXMEDETOMIDINE 6 MCG: 200 INJECTION, SOLUTION INTRAVENOUS at 08:08

## 2023-08-07 RX ADMIN — LIDOCAINE HYDROCHLORIDE 50 MG: 10 INJECTION, SOLUTION EPIDURAL; INFILTRATION; INTRACAUDAL; PERINEURAL at 08:08

## 2023-08-07 RX ADMIN — MIDAZOLAM HYDROCHLORIDE 2 MG: 1 INJECTION INTRAMUSCULAR; INTRAVENOUS at 08:08

## 2023-08-07 RX ADMIN — PROPOFOL 50 MG: 10 INJECTION, EMULSION INTRAVENOUS at 08:08

## 2023-08-07 RX ADMIN — ONDANSETRON 4 MG: 2 INJECTION INTRAMUSCULAR; INTRAVENOUS at 08:08

## 2023-08-07 RX ADMIN — SODIUM CHLORIDE, POTASSIUM CHLORIDE, SODIUM LACTATE AND CALCIUM CHLORIDE: 600; 310; 30; 20 INJECTION, SOLUTION INTRAVENOUS at 07:08

## 2023-08-07 RX ADMIN — MIDAZOLAM HYDROCHLORIDE 2 MG: 1 INJECTION, SOLUTION INTRAMUSCULAR; INTRAVENOUS at 08:08

## 2023-08-07 RX ADMIN — DEXAMETHASONE SODIUM PHOSPHATE 8 MG: 4 INJECTION, SOLUTION INTRA-ARTICULAR; INTRALESIONAL; INTRAMUSCULAR; INTRAVENOUS; SOFT TISSUE at 08:08

## 2023-08-07 RX ADMIN — DEXMEDETOMIDINE 6 MCG: 200 INJECTION, SOLUTION INTRAVENOUS at 09:08

## 2023-08-07 NOTE — OP NOTE
DATE OF PROCEDURE: 8/7/2023    SURGEON: Sherri Nunes M.D.    ASSISTANT:  Shannon Norris PA-C    PREOPERATIVE DIAGNOSIS: Axillary lymphadenopathy [R59.0]  Lymphoma of lymph nodes of axilla, unspecified lymphoma type [C85.94]     POSTOPERATIVE DIAGNOSIS: Post-Op Diagnosis Codes:     * Axillary lymphadenopathy [R59.0]     * Lymphoma of lymph nodes of axilla, unspecified lymphoma type [C85.94]     ANESTHESIA: General Anesthesiologist: José Miguel Watson MD  CRNA: Kim Underwood CRNA     PROCEDURES PERFORMED:   1. Left axillary excisional biopsy with MagSeed localization     EXCISION, LYMPH NODE,USING RADIOLOGICAL MARKER - Left Magseed Localization Need SentiMag Need Faxitron Need Flow Cytometric Analysis:        PROCEDURE IN DETAIL:   Telma Asher is a 42 y.o. female brought to the operating room for definitive surgical management of recent core biopsy revealing left axillary lymph node lymphoma. The patient has elected to undergo excisional biopsy for further evaluation. The patient was informed of the possible risks and complications of the procedure, including but not limited to anesthetic risks, bleeding, infection, and need for additional surgery. The patient concurred with the proposed plan, and has given informed consent. The site of surgery was properly noted/marked in the preoperative holding area.    The patient underwent informed consent. The MagSeed was placed in the  lymph node  of the left axilla. The MagSeed localization films were reviewed.    She was then brought to the Operating Room and placed in the supine position. Anesthesia was administered. The left breast, anterior chest, arm and axilla were then prepped and draped in a sterile fashion.     Attention was turned to the left axilla itself. An incision was made in the lower hairline of the left axilla over the anticipated tract of the seed. The specimen was dissected circumferentially around the seed and area of concern. The  dissection was carried out circumferentially to include the seed. The specimen was completely dissected free. The seed localized specimen was submitted for specimen radiograph. Specimen radiograph confirmed the seed and lymph node of interest were within the specimen. A secondary lymph node was removed and specimen radiograph was performed. No clip was seen.    Within the cavity, hemostasis was achieved with cautery. The wound was irrigated until clear. There was no evidence of bleeding. It was closed in multiple layers with deep dermal and subcutaneous interrupted 3-0 Monocryl sutures and a running 4-0 Monocryl subcuticular skin closure. Dermabond was applied followed by sterile dressings.    All instruments and sponge counts were correct at the end of the procedure.     Significant Surgical Tasks Conducted by the Assistant(s), if Applicable: The skilled assistance of the Physician Assistant, Shannon Norris PA-C, was necessary for the successful completion of this case. She was essential for proper positioning of the patient, manipulation of instruments, proper exposure, manipulation of tissue, and wound closure.       ESTIMATED BLOOD LOSS: 3 ml    Implants: * No implants in log *    Specimens:   Specimen (24h ago, onward)       Start     Ordered    08/07/23 0910  Cytology- [facility] Radiology Guided, Bronch/EBUS, EUS/GI  RELEASE UPON ORDERING        Question:  Release to patient  Answer:  Immediate    08/07/23 0910    08/07/23 0910  Specimen to Pathology  RELEASE UPON ORDERING        References:    Click here for ordering Quick Tip   Question:  Release to patient  Answer:  Immediate    08/07/23 0910                   ID Type Source Tests Collected by Time Destination   A : LEFT AXILLARY LYMPH NODE FOR LYMPH NODE PROTOCOL Tissue Lymph Node CYTOLOGY SPECIMEN- [facility] RADIOLOGY GUIDED, BRONCH/EBUS, EUS/GI, SPECIMEN TO PATHOLOGY, FLOW CYTOMETRY PANEL ([facility]), AFB SMEAR [facility], ANAEROBIC CULTURE [facility], FUNGAL CULTURE [facility], GRAM STAIN  [facility], KOH PREP [facility], TISSUE CULTURE - AEROBIC [facility], MYCOBACTERIA AND NOCARDIA CULTURE Sherri Nunes MD 8/7/2023 0853                 Condition: Good    Disposition: PACU - hemodynamically stable.    Attestation: I performed the procedure.

## 2023-08-07 NOTE — ANESTHESIA PROCEDURE NOTES
Intubation    Date/Time: 8/7/2023 8:29 AM    Performed by: Kim Underwood CRNA  Authorized by: José Miguel Watson MD    Intubation:     Induction:  Intravenous    Intubated:  Postinduction    Mask Ventilation:  Easy mask    Attempts:  1    Attempted By:  CRNA    Difficult Airway Encountered?: No      Airway Device:  Supraglottic airway/LMA    Airway Device Size:  4.0    Style/Cuff Inflation:  Cuffed (inflated to minimal occlusive pressure)    Secured at:  The lips    Placement Verified By:  Capnometry    Complicating Factors:  None    Findings Post-Intubation:  BS equal bilateral

## 2023-08-07 NOTE — TRANSFER OF CARE
Anesthesia Transfer of Care Note    Patient: Telma Asher    Procedure(s) Performed: Procedure(s) (LRB):  EXCISION, LYMPH NODE,USING RADIOLOGICAL MARKER - Left Magseed Localization Need SentiMag Need Faxitron Need Flow Cytometric Analysis (Left)    Patient location: PACU    Anesthesia Type: general    Transport from OR: Transported from OR on room air with adequate spontaneous ventilation    Post pain: adequate analgesia    Post assessment: no apparent anesthetic complications and tolerated procedure well    Post vital signs: stable    Level of consciousness: responds to stimulation    Nausea/Vomiting: no nausea/vomiting    Complications: none    Transfer of care protocol was followed      Last vitals:

## 2023-08-07 NOTE — PLAN OF CARE
Assessment/Plan:        1. Closed nondisplaced fracture of right clavicle, unspecified part of clavicle, sequela  Ambulatory referral to Orthopedics   2. Chest injury, subsequent encounter  XR Ribs Right     Xray of the right rib cage will be done. Use of pain meds discussed.  He will continue with sling and see Ortho since still has significant pain in the right shoulder.    This note has been dictated using voice recognition software. Any grammatical or context distortions are unintentional and inherent to the software.      Return in about 3 months (around 9/21/2019) for Annual physical.    There are no Patient Instructions on file for this visit.        Subjective:    Zaki Sultana is a 58 y.o. male  here for    Chief Complaint   Patient presents with     Hospital Visit Follow Up     Fractured R Clavical 6/8/19     Patient was in the ER on 6/8/19, after the fall on 6/7/19:  He has had pain in the R shoulder and R rib cage.  EXAM: XR SHOULDER RIGHT 2 OR MORE VWS LOCATION: [facility] DATE/TIME: 6/8/2019 3:40 PM INDICATION: shoulder pain and trauma COMPARISON: None. FINDINGS: Oblique fracture involving the distal clavicle with inferior displacement of the distal fragment and AC joint relative to the more proximal shaft. Likely both the CC and AC joints remain intact. Glenohumeral joint appears normal.     Patient was put in an arm sling to immobilize the fracture. Using  percocet for pain control at home only at bedtime.  Pain is better, but still hurts mostly in the upper humerus and shoulder. ALso has pain in the right lower rib cage. Breathing is comfortable.  He is wearing sling during the day and taking of at bedtime.        Social History     Socioeconomic History     Marital status:      Spouse name: Not on file     Number of children: Not on file     Years of education: Not on file     Highest education level: Not on file   Occupational History     Not on file   Social Needs     Financial  Dr. Nunes aware of lab results from 8/2/23- ok to proceed with surgery    resource strain: Not on file     Food insecurity:     Worry: Not on file     Inability: Not on file     Transportation needs:     Medical: Not on file     Non-medical: Not on file   Tobacco Use     Smoking status: Never Smoker     Smokeless tobacco: Never Used   Substance and Sexual Activity     Alcohol use: Yes     Alcohol/week: 0.6 - 1.2 oz     Types: 1 - 2 Cans of beer per week     Drug use: No     Sexual activity: Yes     Partners: Female     Birth control/protection: None   Lifestyle     Physical activity:     Days per week: Not on file     Minutes per session: Not on file     Stress: Not on file   Relationships     Social connections:     Talks on phone: Not on file     Gets together: Not on file     Attends [employer] service: Not on file     Active member of club or organization: Not on file     Attends meetings of clubs or organizations: Not on file     Relationship status: Not on file     Intimate partner violence:     Fear of current or ex partner: Not on file     Emotionally abused: Not on file     Physically abused: Not on file     Forced sexual activity: Not on file   Other Topics Concern     Not on file   Social History Narrative     Not on file       Family History   Problem Relation Age of Onset     Diabetes Mother      Stroke Mother      Hypertension Mother      Diabetes Father      Review of Systems:     A 12 point comprehensive review of systems was negative except as noted in HPI.            Objective:    Physical Exam   /66   Pulse 72   Wt 143 lb (64.9 kg)   SpO2 96%   BMI 21.74 kg/m      Constitutional: oriented to person, place, and time, appears well-nourished. No distress.   HENT:   Head: Normocephalic.   Neck: Normal range of motion.    Pulmonary/Chest: Effort normal   Musculoskeletal: He can lift up right arm, to shoulder shanon. Most of the pain is in the biceps tendon area and upper humerus. No pain on clavicle palpation. Tender on the palpation in the right low rib  cage.  Neurological: alert and oriented to person, place, and time.  Psychiatric:  normal mood and affect.    Patient Active Problem List   Diagnosis     Epilepsy       Current Outpatient Medications on File Prior to Visit   Medication Sig Dispense Refill     DILANTIN EXTENDED 100 mg ER capsule TAKE 3 CAPSULES BY MOUTH ONCE DAILY 90 capsule 0     oxyCODONE-acetaminophen (PERCOCET/ENDOCET) 5-325 mg per tablet Take 1 tablet by mouth every 6 (six) hours as needed for pain. 13 tablet 0     No current facility-administered medications on file prior to visit.                Debora Ring  6/21/2019

## 2023-08-07 NOTE — ANESTHESIA PREPROCEDURE EVALUATION
"                                                                                                             2023  Telma Asher is a 42 y.o., female   Pre-operative evaluation for Procedure(s) (LRB):  EXCISION, LYMPH NODE,USING RADIOLOGICAL MARKER - Left Magseed Localization Need SentiMag Need Faxitron Need Flow Cytometric Analysis (Left)    BP (!) 147/94   Pulse 83   Temp 36.9 °C (98.5 °F) (Oral)   Ht 5' 9" (1.753 m)   Wt 100.9 kg (222 lb 7.1 oz)   LMP 2023 (Exact Date)   SpO2 100%   Breastfeeding No   BMI 32.85 kg/m²     Past Medical History:   Diagnosis Date    Anemia, unspecified     Endometriosis of uterus 2019    Removed at time of etopic pregnancy    Enlarged lymph node     left axilla       Patient Active Problem List   Diagnosis    Axillary lymphadenopathy       Review of patient's allergies indicates:  No Known Allergies    Current Outpatient Medications   Medication Instructions    iron fum-B12-IF-C-folic acid (FOLTRIN) 110-0.5 mg capsule 1 capsule, Oral, 2 times daily    Lactobac no.41/Bifidobact no.7 (PROBIOTIC-10 ORAL) Oral, Nature Target Probiotics. .07oz    mv-min/folic/vit K/lycop/coQ10 (DAILY MULTIVITAMIN ORAL) Oral, Fenton light multivitamin    thymol/chlorophyllin (CHLOROPHYLL ORAL) Oral, With Elderberry, ACV, Sea Ellis, echinacea, and Vitamin D3, C, B12       Past Surgical History:   Procedure Laterality Date     SECTION      x3- 2001; 2006; 2008     SECTION  2001,2006,, 2019     delivers and 1-etopic pregnancy 2019    LYMPH NODE BIOPSY Left 2023         Lab Results   Component Value Date    WBC 3.27 (L) 2023    HGB 11.2 (L) 2023    HCT 34.6 (L) 2023    MCV 85.0 2023     2023          BMP  Lab Results   Component Value Date     2023    K 3.9 2023    CHLORIDE 107 2023    CO2 26 2023    GLUCOSE 90 2023    BUN 8.6 2023    CREATININE 0.76 2023 " "   CALCIUM 9.8 08/02/2023        INR  No results for input(s): "PT", "INR", "PROTIME", "APTT" in the last 72 hours.        Diagnostic Studies:      EKG:  No results found for this or any previous visit.    No results found for this or any previous visit.        .      Pre-op Assessment    I have reviewed the Patient Summary Reports.    I have reviewed the NPO Status.   I have reviewed the Medications.     Review of Systems  Anesthesia Hx:  No problems with previous Anesthesia  Denies Family Hx of Anesthesia complications.   Denies Personal Hx of Anesthesia complications.   Cardiovascular:  Cardiovascular Normal  No Cardiac Complaints   Pulmonary:  Pulmonary Normal No Pulmonary Complaints   Hepatic/GI:   No Current GERD Sx       Physical Exam  General: Alert and Oriented    Airway:  Mallampati: II   Mouth Opening: Normal  TM Distance: Normal  Tongue: Normal  Neck ROM: Normal ROM    Dental:  Intact    Chest/Lungs:  Clear to auscultation, Normal Respiratory Rate    Heart:  Rate: Normal  Rhythm: Regular Rhythm        Anesthesia Plan  Type of Anesthesia, risks & benefits discussed:    Anesthesia Type: Gen Supraglottic Airway  Intra-op Monitoring Plan: Standard ASA Monitors  Post Op Pain Control Plan: multimodal analgesia  Induction:  IV and Inhalation  Airway Plan: Direct  Informed Consent: Informed consent signed with the Patient and all parties understand the risks and agree with anesthesia plan.  All questions answered.   ASA Score: 2  Day of Surgery Review of History & Physical: H&P Update referred to the surgeon/provider.  Anesthesia Plan Notes: Disc possible need to convert to GETA    Discussed Anesthetic Plan w/ Pt/Family. Questions Entertained. Accepted.      Ready For Surgery From Anesthesia Perspective.     .      "

## 2023-08-07 NOTE — PLAN OF CARE
VSS, beth score 9, pt arousable and ready for transfer to [facility] per Dr Watson.

## 2023-08-07 NOTE — BRIEF OP NOTE
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]    Procedure(s) (LRB):  EXCISION, LYMPH NODE,USING RADIOLOGICAL MARKER - Left Magseed Localization Need SentiMag Need Faxitron Need Flow Cytometric Analysis (Left)    Anesthesia: General    Findings/Key Components: Removal of left axillary lymph node for pathology evaluation    Estimated Blood Loss: 3 ml         Specimens:   Specimen (24h ago, onward)       Start     Ordered    08/07/23 0910  Cytology- [facility] Radiology Guided, Bronch/EBUS, EUS/GI  RELEASE UPON ORDERING        Question:  Release to patient  Answer:  Immediate    08/07/23 0910    08/07/23 0910  Specimen to Pathology  RELEASE UPON ORDERING        References:    Click here for ordering Quick Tip   Question:  Release to patient  Answer:  Immediate    08/07/23 0910                  ID Type Source Tests Collected by Time Destination   A : LEFT AXILLARY LYMPH NODE FOR LYMPH NODE PROTOCOL Tissue Lymph Node CYTOLOGY SPECIMEN- [facility] RADIOLOGY GUIDED, BRONCH/EBUS, EUS/GI, SPECIMEN TO PATHOLOGY, FLOW CYTOMETRY PANEL ([facility]), AFB SMEAR [facility], ANAEROBIC CULTURE [facility], FUNGAL CULTURE [facility], GRAM STAIN [facility], KOH PREP [facility], TISSUE CULTURE - AEROBIC [facility], MYCOBACTERIA AND NOCARDIA CULTURE Sherri Nunes MD 8/7/2023 0853        Discharge Note    SUMMARY     Admit Date: 8/7/2023    Discharge Date and Time:  08/07/2023 9:36 AM    Hospital Course (synopsis of major diagnoses, care, treatment, and services provided during the course of the hospital stay): She is status post left axillary lymph node excisional biopsy      Final Diagnosis:  Post-Op Diagnosis Codes:     * Axillary lymphadenopathy [R59.0]     * Lymphoma of lymph nodes of axilla, 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    Specialties: Breast Surgery, General Surgery  Why: 8/15/2023 11:40 AM  Contact information:  [address] [place] [address]  [phone]                             Medications:  Reconciled Home Medications:      Medication List        START taking these medications      traMADoL 50 mg tablet  Commonly known as: ULTRAM  Take 1 tablet (50 mg total) by mouth every 6 (six) hours as needed for Pain.            CONTINUE taking these medications      CHLOROPHYLL ORAL  Take by mouth. With Elderberry, ACV, Sea Ellis, echinacea, and Vitamin D3, C, B12     DAILY MULTIVITAMIN ORAL  Take by mouth. [place] light multivitamin     iron fum-B12-IF-C-folic acid 110-0.5 mg capsule  Commonly known as: FOLTRIN  Take 1 capsule by mouth 2 (two) times daily.     PROBIOTIC-10 ORAL  Take by mouth. Nature Target Probiotics. .07oz            Discharge Procedure Orders   Diet general     Ice to affected area     Lifting restrictions     Remove dressing in 24 hours   Order Comments: Leave glue and steri strips until clinic visit     Call MD for:  temperature >100.4     Call MD for:  persistent nausea and vomiting     Call MD for:  severe uncontrolled pain     Call MD for:  difficulty breathing, headache or visual disturbances     Call MD for:  redness, tenderness, or signs of infection (pain, swelling, redness, odor or green/yellow discharge around incision site)     Call MD for:  hives     Call MD for:  persistent dizziness or light-headedness      Follow-up Information       Sherri Nunes MD Follow up.    Specialties: Breast Surgery, General Surgery  Why: 8/15/2023 11:40 AM  Contact information:  [address] [facility]  [address]  [phone]

## 2023-08-07 NOTE — INTERVAL H&P NOTE
The patient has been examined and the H&P has been reviewed:    I concur with the findings and no changes have occurred since H&P was written.    Surgery risks, benefits and alternative options discussed and understood by patient/family.          All of questions were answered    Sherri Nunes MD  Breast Surgical Oncology

## 2023-08-07 NOTE — ANESTHESIA POSTPROCEDURE EVALUATION
Anesthesia Post Evaluation    Patient: Telma Asher    Procedure(s) Performed: Procedure(s) (LRB):  EXCISION, LYMPH NODE,USING RADIOLOGICAL MARKER - Left Magseed Localization Need SentiMag Need Faxitron Need Flow Cytometric Analysis (Left)    Final Anesthesia Type: general      Patient location during evaluation: PACU  Patient participation: Yes- Able to Participate  Level of consciousness: awake  Post-procedure vital signs: reviewed and stable  Pain management: adequate  Airway patency: patent    PONV status at discharge: vomiting (controlled) and nausea (controlled)  Anesthetic complications: no      Cardiovascular status: hemodynamically stable  Respiratory status: spontaneous ventilation and unassisted  Hydration status: euvolemic  Follow-up not needed.  Comments:              Vitals Value Taken Time   /84 08/07/23 1042   Temp 36 °C (96.8 °F) 08/07/23 0934   Pulse 59 08/07/23 1042   Resp 13 08/07/23 1003   SpO2 96 % 08/07/23 1042   Vitals shown include unvalidated device data.      Event Time   Out of Recovery 10:05:00         Pain/Glenroy Score: Glenroy Score: 10 (8/7/2023 10:15 AM)

## 2023-08-07 NOTE — DISCHARGE INSTRUCTIONS
BREAST SURGERY POST-OP INSTRUCTIONS  DR. SIVAKUMAR KAHN PA-C     How do I care for my incisions?  You and your caregiver should look at your incision daily. Call your doctor if you see any redness or drainage from your incision.  You will be given a support bra, wear this for 24 hours a day (unless showering or sponge bathing) for comfort and to help decrease amount of swelling. If the bra fits too loose or too tight you may go to your local store a purchase a front opening sports bra that fits snug.   Your incision will be closed with sutures (stitches) under your skin. These sutures dissolve on their own, so they do not need to be removed.  · If you go home with Steri-StripsTM on your incision, they will loosen and fall off by themselves. If they havent fallen off within 14 days, you may remove them.  · If you go home with glue over your sutures (stitches), it will also loosen and peel off, similarly to the Steri-Strips.  ** If you have had a Mastectomy and/or Reconstruction and/or Axillary Lymph Node Dissection:  You may have 1-2 Yovani-Dimas Drains in place. Please refer to the additional instructions discussing care of your drains.     Is it normal to feel new sensations?  As you are healing, you may feel a several different sensations in your breast. Tenderness, numbness, and twinges are common examples. These sensations usually come and go, and will lessen over time, usually within the first few months after surgery. As you continue to heal, you may feel scar tissue along your incision site. It will feel hard. This is common and will soften over the next several months.     Can I shower?  You can shower 24 hours after your surgery. Taking a warm shower is relaxing and can help decrease discomfort. Use soap when you shower and gently wash your incision. Pat the areas dry with a towel after showering, and leave your incision uncovered, unless you have drainage from your incision. If you  have drainage, call your doctors office.  Do not take tub baths, swim, or use hot tubs or saunas until you discuss it with your doctor at the first appointment after your surgery.    Will I have pain when I am home?  The length of time each person has pain or discomfort varies. You will be given a prescription for pain medication before you go home. Follow the guidelines below to manage your pain.  · Take your medication as directed and as needed.  · Icing the affected area can also alleviate pain symptoms (ice the affected area at least four times a day, 15-20 minutes at a time).  · Call your doctor if the pain medication prescribed for you doesnt relieve your pain.  · Do not drive or drink alcohol while you are taking prescription pain medication.  · As your incision heals, you will have less pain and need less pain medication. A mild pain reliever such as acetaminophen (Tylenol) or ibuprofen (Advil) will relieve aches and discomfort. However, large quantities of acetaminophen may be harmful to your liver. Do not take more acetaminophen than the amount directed on the bottle or as instructed by your doctor or nurse.  · Pain medication should help you as you resume your normal activities. Pain medication is most effective 30 to 45 minutes after taking it.  · Keep track of when you take your pain medication. Taking it when your pain first begins is more effective than waiting for the pain to get worse.  Pain medication may cause constipation (having fewer bowel movements than what is normal for you).    How can I prevent constipation?  · Go to the bathroom at the same time every day. Your body will get used to going at that time.  · If you feel the urge to go, do not put it off. Try to use the bathroom 5 to 15 minutes after meals.  · After breakfast is a good time to move your bowels because the reflexes in your colon are strongest then.  · Exercise if you can; walking is an excellent form of exercise.  · Drink 8  (8-ounce) glasses (2 liters) of liquids daily, if you can. Drink water, juices, soups, ice cream shakes, and other drinks that do not have caffeine. Beverages with caffeine, such as coffee and soda, pull fluid out of the body.  · Slowly increase the fiber in your diet to 25 to 35 grams per day. Fruits, vegetables, whole grains, and cereals contain fiber. If you have an ostomy or have had recent bowel surgery, check with your doctor or nurse before making any changes in your diet.  · Both over-the-counter and prescription medications are available to treat constipation. Start with 1 of the following over-the-counter medications first:  o Docusate sodium (Colace®) 100 mg. Take __1___ capsules __1___ time a day. This is a stool softener that causes few side effects. Do not take it with mineral oil.  o Polyethylene glycol (MiraLAX®) 17 grams daily.  o Senna (Senokot®) 2 tablets at bedtime. This is a stimulant laxative, which can cause cramping.  · If you havent had a bowel movement in 2 days, call your doctor or nurse.    Will I be able to eat?  You can resume eating when you go home after surgery. Eating a balanced diet high in protein will help you heal after surgery. Your diet should include a healthy protein source at each meal, as well as fruits, vegetables, and whole grains. If you have questions about your diet, ask to see a dietitian.    When is it safe for me to drive and what activities can I perform?  You may resume driving after surgery as long as you are not taking prescription pain medication that may make you drowsy, and you have your full range of motion.  You should not lift anything heavier than 10-15 lbs the first week. After this time, you may gradually increase the amount of weight. You want to take it easy the first 2 weeks, no strenuous or repetitive movements such as vacuuming or scrubbing. Walking is okay. Ask the doctor if you have questions.    How long until I have the pathology  results?  The pathology report usually takes to 7 to 10 business days.    When is my first appointment after my surgery?  You should be given or schedule a follow-up appointment 1 to 2 weeks after your surgery.    How can I cope with my feelings?   After surgery for a serious illness, you may have new and upsetting feelings. Many patients say they felt sad, worried, nervous, irritable, or angry at one time or another. You may find that you cannot control some of these feelings. If this happens, its a good idea to seek emotional support.  The first step in coping is to talk about how you feel. Family and friends can help. Your nurse, doctor, and  can reassure, support, and guide you. It is always a good idea to let these professionals know how you, your family, and your friends are feeling emotionally. Many resources are available to patients and their families. Whether you are in the hospital or at home, we are here to help you and your family and friends handle the emotional aspects of your illness.    What if I have other questions?  If you have any questions or concerns, please talk with your doctor or nurse. You can reach them Monday through Thursday from 9:00 AM to 5:00 PM and Friday from 9:00 AM to 12:00 PM at [phone].  After 5:00 PM M-Th or 12:00 PM Fri, during the weekend, and on holidays, please call [phone] and ask for the doctor on call.    PLEASE CALL YOUR DOCTOR OR NURSE IF YOU HAVE:  · A temperature of 101° F (38.3° C) or higher  · Shortness of breath  · Warmer than normal skin around your incision  · Increased discomfort in the area  · Increased redness around your incision  · New or increased swelling around your incision  · Discharge from your incision

## 2023-08-08 LAB
GRAM STN SPEC: NORMAL
GRAM STN SPEC: NORMAL
KOH PREP SPEC: NORMAL

## 2023-08-09 LAB
M AVIUM PARATB TISS QL ZN STN: NORMAL
RHODAMINE-AURAMINE STN SPEC: NORMAL

## 2023-08-10 LAB — MAYO GENERIC ORDERABLE RESULT: NORMAL

## 2023-08-11 LAB
BACTERIA SPEC ANAEROBE CULT: NORMAL
PSYCHE PATHOLOGY RESULT: NORMAL

## 2023-08-11 NOTE — PROGRESS NOTES
[facility] Breast Surg  Breast Surgical Oncology  Follow-Up Patient Office Visit       Referring Provider: No ref. provider found  PCP: Sandra Love MD   Medical Oncologist: No care team member to display   Radiation Oncologist: No care team member to display   Other Care Providers:     Chief Complaint:   Chief Complaint   Patient presents with    Post-op Evaluation     Post op visit.        Subjective:   Treatment History:  2023 Left Excisional Lymph Node Biopsy    Interval History:  8/15/2023 - Telma Asher presents today for her post op appointment.    HPI:  Telma Asher is a 42 y.o. female who presents on 2023 for evaluation of  left axillary adenopathy . She has a history of developing cold like symptoms (mostly consisting of cough, sore throat and sinus congestions) just before presenting for her screening mammogram on 2023. She subsequently found out her daughter had Covid and her symptoms worsened between the time of her additional views and her biopsy. The day after her biopsy she presented to the urgent care where they tested her for RSV (per patient - don't see in care everywhere records) and Covid. She was negative Covid and positive for RSV (per report). She was not given any medications, but conservative measures were discussed. She has since resolved. She also has a negative review of symptoms as listed below.     A detailed patient history was obtained and reviewed. She currently denies any breast issues including rashes, redness, pain, swelling, nipple discharge, or new lumps/masses.     MG breast density: Category B (scattered areas of fibroglandular tissue)      Imagin2023 BL SC MG at [facility] - which revealed in L breast  a focal asymmetry in the upper outer quadrant posterior depth.  There are lymph nodes with apparent cortical thickening within bilateral axillae. BIRADS-0;additional imaging needed.     5/15/2023 L DG MG/US L  BREAST LIMITED/R US AXILLA at [facility] - which revealed on   L MG the focal asymmetry in the upper-outer quadrant posterior depth  effaces into normal fibroglandular tissue.  The mammographic view of the L axilla demonstrates apparent cortical thickening of the axillary lymph nodes within the field of view. No other significant mammographic finding in the left breast and axilla.   L US - no suspicious sonographic finding in the upper-outer quadrant.  Along the left breast 1 o'clock - 2 o'clock area 10-12 cm FN, there is only normal fibroductoglandular tissue, correlating with the focal asymmetry recalled from screening mammography. In the level 1 steven stations of both axillae, there are morphologically abnormal lymph nodes with uniform cortical thickening (up to 0.6 cm) and partial to complete effacement of their fatty funmi.  BIRADS-4 suspicious;biopsy recommended.     3. 7/25/2023 BL US AXILLA at [facility] - which revealed persistent enlarged of bilateral axillary lymph nodes (final report still pending)        Pathology:  5/24/2023 Ultrasound-guided Core Needle Biopsy Left Axilla - Lymph node with small CD 19, CD 20, and CD 10 positive, Kappa-restricted B cell population as tested by flow cytometry  IMMUNOHISTOCHEMICAL STAINS:   CD20 AND BCL-2: POSITIVE IN B LYMPHOCYTES.   CD3, CD5 AND CD23: POSITIVE IN T LYMPHOCYTES.   BCL-6 AND CD10: NEGATIVE.   KAPPA AND LAMBDA: INCONCLUSIVE.   KI-67: SHOWS NUCLEAR POSITIVITY IN 3-4% OF LYMPHOCYTES  Note - Morphologic and immunohistochemical analysis does not demonstrate evidence of lymphoma. The significance of the population identified by flow cytometry is unclear and may be due to sampling or oligoclonal reactive process, among others. Recommend excision for further evaluation.    2. 8/7/2023 Left Excisional Lymph Node Biopsy which revealed reactive lymphoid hyperplasia.     OB/GYN History:  Age at Menarche Onset: 13  Menopausal Status: premenopausal, LMP: Patient's last menstrual  period was 2023 (exact date).  Hysterectomy/Oophorectomy: NA, at age NA (one ovary removed)  Hormonal birth control (duration): Yes OCP (estrogen/progesterone).   Pregnancy History:   Age at first live birth: 20  Hormone Replacement Therapy: No, none  Patient did not breast feed.  Patient denies nipple discharge.   Patient denies to previous breast biopsy.   Patient denies to a personal history of breast cancer.     Other Relevant History:  Prior thoracic RT: none  Genetic testing: No  Ashkenazi Orthodoxy descent: No       Family History:  Family History   Problem Relation Age of Onset    No Known Problems Mother     No Known Problems Father     Hypertension Maternal Grandmother     No Known Problems Paternal Grandmother     No Known Problems Paternal Grandfather         Past History:  Past Medical History:   Diagnosis Date    Anemia, unspecified     Endometriosis of uterus 2019    Removed at time of etopic pregnancy    Enlarged lymph node     left axilla        Past Surgical History:   Procedure Laterality Date     SECTION      x3- ; 2006; 2008     SECTION  ,2006,, 2019     delivers and 1-etopic pregnancy 2019    LYMPH NODE BIOPSY Left 2023    SURGICAL REMOVAL OF LYMPH NODE Left 2023    Procedure: EXCISION, LYMPH NODE,USING RADIOLOGICAL MARKER - Left Magseed Localization Need SentiMag Need Faxitron Need Flow Cytometric Analysis;  Surgeon: Sherri Nunes MD;  Location: [facility] OR;  Service: General;  Laterality: Left;  Need SentiMag  Need Faxitron  Need Flow Cytometric Analysis        Social History     Socioeconomic History    Marital status:      Spouse name: Bony Coelho Jr.    Number of children: 3   Occupational History    Occupation: [employer]:    Tobacco Use    Smoking status: Never    Smokeless tobacco: Never   Substance and Sexual Activity    Alcohol use: Yes     Comment: Occasionally for holidays    Drug use: Never     "Sexual activity: Yes     Partners: Male     Birth control/protection: None        Body mass index is 32.78 kg/m².     Allergy/Medications:   Review of patient's allergies indicates:  No Known Allergies       Current Outpatient Medications:     iron fum-B12-IF-C-folic acid (FOLTRIN) 110-0.5 mg capsule, Take 1 capsule by mouth 2 (two) times daily., Disp: 60 capsule, Rfl: 11    Lactobac no.41/Bifidobact no.7 (PROBIOTIC-10 ORAL), Take by mouth. Nature Target Probiotics. .07oz, Disp: , Rfl:     mv-min/folic/vit K/lycop/coQ10 (DAILY MULTIVITAMIN ORAL), Take by mouth. [place] light multivitamin, Disp: , Rfl:     thymol/chlorophyllin (CHLOROPHYLL ORAL), Take by mouth. With Elderberry, ACV, [place], echinacea, and Vitamin D3, C, B12, Disp: , Rfl:        Review of Systems:  Review of Systems   All other systems reviewed and are negative.          Objective:     Vitals:  Blood pressure 123/77, pulse 76, temperature 98 °F (36.7 °C), temperature source Oral, resp. rate 18, height 5' 9" (1.753 m), weight 100.7 kg (222 lb), last menstrual period 08/07/2023, SpO2 98 %.      Physical Exam:  General: The patient is awake, alert and oriented times three. The patient is well nourished and in no acute distress.   Left: Examination of the left axilla reveals a well healing incision with some swelling in the axilla likely due to seroma.      Assessment and Plan:     Encounter Diagnoses   Name Primary?    Axillary lymphadenopathy Yes    Status post lymph node biopsy       Her pathology results were discussed in detail.     Her next steps were discussed.     Discussed with Dr. Gomez. Still recommend PET CT given the initial diagnosis of follicular lymphoma       Plan:     Problem List Items Addressed This Visit          Other    Axillary lymphadenopathy - Primary    Current Assessment & Plan     1. Recommend repeat labs - in 3 months  2. Still recommend evaluation with Dr. Gomez  3. Still recommend PET CT - given initial core needle " biopsy and after discussion with Dr. Gomez  4. Clinical follow-up recommended in 3 months  5. Recommend BL SC MG - due May 2024          Other Visit Diagnoses       Status post lymph node biopsy                    All of questions were answered    Sherri Nunes MD  Breast Surgical Oncology     OFFICE VISIT CODING:  Post Op Visit

## 2023-08-13 LAB — BACTERIA SPEC CULT: NORMAL

## 2023-08-14 ENCOUNTER — TELEPHONE (OUTPATIENT)
Dept: SURGERY | Facility: CLINIC | Age: 43
End: 2023-08-14
Payer: COMMERCIAL

## 2023-08-14 NOTE — TELEPHONE ENCOUNTER
Patient called with her pathology results.        ----- Message from Sherri Nunes MD sent at 8/14/2023  2:59 PM CDT -----  Please call the patient and inform pathology results revealed reactive lymph node. No evidence of cancer. Cultures negative so far. Further discussion will be had at their post-op/follow-up appointment.

## 2023-08-15 ENCOUNTER — OFFICE VISIT (OUTPATIENT)
Dept: SURGERY | Facility: CLINIC | Age: 43
End: 2023-08-15
Payer: COMMERCIAL

## 2023-08-15 VITALS
BODY MASS INDEX: 32.88 KG/M2 | HEIGHT: 69 IN | WEIGHT: 222 LBS | RESPIRATION RATE: 18 BRPM | HEART RATE: 76 BPM | DIASTOLIC BLOOD PRESSURE: 77 MMHG | TEMPERATURE: 98 F | SYSTOLIC BLOOD PRESSURE: 123 MMHG | OXYGEN SATURATION: 98 %

## 2023-08-15 DIAGNOSIS — R59.0 AXILLARY LYMPHADENOPATHY: Primary | ICD-10-CM

## 2023-08-15 DIAGNOSIS — Z98.890 STATUS POST LYMPH NODE BIOPSY: ICD-10-CM

## 2023-08-15 DIAGNOSIS — Z12.31 ENCOUNTER FOR SCREENING MAMMOGRAM FOR MALIGNANT NEOPLASM OF BREAST: ICD-10-CM

## 2023-08-15 PROCEDURE — 99999 PR PBB SHADOW E&M-EST. PATIENT-LVL IV: ICD-10-PCS | Mod: PBBFAC,,, | Performed by: SURGERY

## 2023-08-15 PROCEDURE — 1159F MED LIST DOCD IN RCRD: CPT | Mod: CPTII,S$GLB,, | Performed by: SURGERY

## 2023-08-15 PROCEDURE — 1160F RVW MEDS BY RX/DR IN RCRD: CPT | Mod: CPTII,S$GLB,, | Performed by: SURGERY

## 2023-08-15 PROCEDURE — 3074F SYST BP LT 130 MM HG: CPT | Mod: CPTII,S$GLB,, | Performed by: SURGERY

## 2023-08-15 PROCEDURE — 99024 POSTOP FOLLOW-UP VISIT: CPT | Mod: S$GLB,,, | Performed by: SURGERY

## 2023-08-15 PROCEDURE — 3078F DIAST BP <80 MM HG: CPT | Mod: CPTII,S$GLB,, | Performed by: SURGERY

## 2023-08-15 PROCEDURE — 3044F PR MOST RECENT HEMOGLOBIN A1C LEVEL <7.0%: ICD-10-PCS | Mod: CPTII,S$GLB,, | Performed by: SURGERY

## 2023-08-15 PROCEDURE — 3074F PR MOST RECENT SYSTOLIC BLOOD PRESSURE < 130 MM HG: ICD-10-PCS | Mod: CPTII,S$GLB,, | Performed by: SURGERY

## 2023-08-15 PROCEDURE — 3008F BODY MASS INDEX DOCD: CPT | Mod: CPTII,S$GLB,, | Performed by: SURGERY

## 2023-08-15 PROCEDURE — 3008F PR BODY MASS INDEX (BMI) DOCUMENTED: ICD-10-PCS | Mod: CPTII,S$GLB,, | Performed by: SURGERY

## 2023-08-15 PROCEDURE — 99024 PR POST-OP FOLLOW-UP VISIT: ICD-10-PCS | Mod: S$GLB,,, | Performed by: SURGERY

## 2023-08-15 PROCEDURE — 3078F PR MOST RECENT DIASTOLIC BLOOD PRESSURE < 80 MM HG: ICD-10-PCS | Mod: CPTII,S$GLB,, | Performed by: SURGERY

## 2023-08-15 PROCEDURE — 99999 PR PBB SHADOW E&M-EST. PATIENT-LVL IV: CPT | Mod: PBBFAC,,, | Performed by: SURGERY

## 2023-08-15 PROCEDURE — 1159F PR MEDICATION LIST DOCUMENTED IN MEDICAL RECORD: ICD-10-PCS | Mod: CPTII,S$GLB,, | Performed by: SURGERY

## 2023-08-15 PROCEDURE — 3044F HG A1C LEVEL LT 7.0%: CPT | Mod: CPTII,S$GLB,, | Performed by: SURGERY

## 2023-08-15 PROCEDURE — 1160F PR REVIEW ALL MEDS BY PRESCRIBER/CLIN PHARMACIST DOCUMENTED: ICD-10-PCS | Mod: CPTII,S$GLB,, | Performed by: SURGERY

## 2023-08-16 NOTE — ASSESSMENT & PLAN NOTE
1. Recommend repeat labs - in 3 months  2. Still recommend evaluation with Dr. Gomez  3. Still recommend PET CT - given initial core needle biopsy and after discussion with Dr. Gomez  4. Clinical follow-up recommended in 3 months  5. Recommend BL SC MG - due May 2024

## 2023-08-17 ENCOUNTER — TELEPHONE (OUTPATIENT)
Dept: SURGERY | Facility: CLINIC | Age: 43
End: 2023-08-17
Payer: COMMERCIAL

## 2023-08-17 ENCOUNTER — PATIENT MESSAGE (OUTPATIENT)
Dept: HEMATOLOGY/ONCOLOGY | Facility: CLINIC | Age: 43
End: 2023-08-17
Payer: COMMERCIAL

## 2023-08-24 ENCOUNTER — TELEPHONE (OUTPATIENT)
Dept: HEMATOLOGY/ONCOLOGY | Facility: CLINIC | Age: 43
End: 2023-08-24
Payer: COMMERCIAL

## 2023-08-24 NOTE — TELEPHONE ENCOUNTER
Per pt request, she does not want to see Hem/Onc, Dr. Gomez, at this time. She has canceled her upcoming 8/24/23 new pt appt.    I advised her to call me back to reschedule if anything changes.    LEWIS Whaley

## 2023-09-11 LAB — FUNGUS SPEC CULT: NORMAL

## 2023-09-20 LAB — MYCOBACTERIUM SPEC QL CULT: NORMAL

## 2023-10-27 LAB
PAP RECOMMENDATION EXT: NORMAL
PAP SMEAR: NORMAL

## 2023-10-31 ENCOUNTER — PATIENT OUTREACH (OUTPATIENT)
Dept: ADMINISTRATIVE | Facility: HOSPITAL | Age: 43
End: 2023-10-31
Payer: COMMERCIAL

## 2023-11-03 ENCOUNTER — OFFICE VISIT (OUTPATIENT)
Dept: FAMILY MEDICINE | Facility: CLINIC | Age: 43
End: 2023-11-03
Payer: COMMERCIAL

## 2023-11-03 VITALS
WEIGHT: 224 LBS | OXYGEN SATURATION: 100 % | TEMPERATURE: 98 F | DIASTOLIC BLOOD PRESSURE: 80 MMHG | HEIGHT: 69 IN | BODY MASS INDEX: 33.18 KG/M2 | HEART RATE: 71 BPM | SYSTOLIC BLOOD PRESSURE: 138 MMHG

## 2023-11-03 DIAGNOSIS — D50.0 IRON DEFICIENCY ANEMIA DUE TO CHRONIC BLOOD LOSS: Primary | ICD-10-CM

## 2023-11-03 DIAGNOSIS — Z00.00 WELLNESS EXAMINATION: ICD-10-CM

## 2023-11-03 PROCEDURE — 3079F PR MOST RECENT DIASTOLIC BLOOD PRESSURE 80-89 MM HG: ICD-10-PCS | Mod: CPTII,,, | Performed by: FAMILY MEDICINE

## 2023-11-03 PROCEDURE — 3075F PR MOST RECENT SYSTOLIC BLOOD PRESS GE 130-139MM HG: ICD-10-PCS | Mod: CPTII,,, | Performed by: FAMILY MEDICINE

## 2023-11-03 PROCEDURE — 1159F PR MEDICATION LIST DOCUMENTED IN MEDICAL RECORD: ICD-10-PCS | Mod: CPTII,,, | Performed by: FAMILY MEDICINE

## 2023-11-03 PROCEDURE — 99213 OFFICE O/P EST LOW 20 MIN: CPT | Mod: ,,, | Performed by: FAMILY MEDICINE

## 2023-11-03 PROCEDURE — 3008F BODY MASS INDEX DOCD: CPT | Mod: CPTII,,, | Performed by: FAMILY MEDICINE

## 2023-11-03 PROCEDURE — 3044F PR MOST RECENT HEMOGLOBIN A1C LEVEL <7.0%: ICD-10-PCS | Mod: CPTII,,, | Performed by: FAMILY MEDICINE

## 2023-11-03 PROCEDURE — 3044F HG A1C LEVEL LT 7.0%: CPT | Mod: CPTII,,, | Performed by: FAMILY MEDICINE

## 2023-11-03 PROCEDURE — 1160F RVW MEDS BY RX/DR IN RCRD: CPT | Mod: CPTII,,, | Performed by: FAMILY MEDICINE

## 2023-11-03 PROCEDURE — 1159F MED LIST DOCD IN RCRD: CPT | Mod: CPTII,,, | Performed by: FAMILY MEDICINE

## 2023-11-03 PROCEDURE — 3079F DIAST BP 80-89 MM HG: CPT | Mod: CPTII,,, | Performed by: FAMILY MEDICINE

## 2023-11-03 PROCEDURE — 99213 PR OFFICE/OUTPT VISIT, EST, LEVL III, 20-29 MIN: ICD-10-PCS | Mod: ,,, | Performed by: FAMILY MEDICINE

## 2023-11-03 PROCEDURE — 1160F PR REVIEW ALL MEDS BY PRESCRIBER/CLIN PHARMACIST DOCUMENTED: ICD-10-PCS | Mod: CPTII,,, | Performed by: FAMILY MEDICINE

## 2023-11-03 PROCEDURE — 3008F PR BODY MASS INDEX (BMI) DOCUMENTED: ICD-10-PCS | Mod: CPTII,,, | Performed by: FAMILY MEDICINE

## 2023-11-03 PROCEDURE — 3075F SYST BP GE 130 - 139MM HG: CPT | Mod: CPTII,,, | Performed by: FAMILY MEDICINE

## 2023-11-03 RX ORDER — FERROUS SULFATE 325(65) MG
325 TABLET ORAL 2 TIMES DAILY
Qty: 180 TABLET | Refills: 2 | Status: SHIPPED | OUTPATIENT
Start: 2023-11-03 | End: 2024-02-01

## 2023-11-03 NOTE — PROGRESS NOTES
Patient ID: [id number]     Chief Complaint:  Anemia    HPI:     Telma Asher is a 43 y.o. female here today for a follow up.  Patient was supposed to complete labs prior to visit-did not realize.   1) Anemia:  Significant improvement in quality of life since last visit-definite improvement in energy-did have biopsy completed-no acute findings.  Does have follow-up appointment with breast surgeon.      Past Medical History:   Diagnosis Date    Anemia, unspecified     Endometriosis of uterus 2019    Removed at time of etopic pregnancy    Enlarged lymph node     left axilla        Past Surgical History:   Procedure Laterality Date     SECTION      x3- ; ;      SECTION  ,,, 2019     delivers and 1-etopic pregnancy 2019    LYMPH NODE BIOPSY Left 2023    SURGICAL REMOVAL OF LYMPH NODE Left 2023    Procedure: EXCISION, LYMPH NODE,USING RADIOLOGICAL MARKER - Left Magseed Localization Need SentiMag Need Faxitron Need Flow Cytometric Analysis;  Surgeon: Sherri Nunes MD;  Location: [facility] OR;  Service: General;  Laterality: Left;  Need SentiMag  Need Faxitron  Need Flow Cytometric Analysis        Social History     Tobacco Use    Smoking status: Never    Smokeless tobacco: Never   Substance Use Topics    Alcohol use: Yes     Comment: Occasionally for holidays    Drug use: Never        Social History     Socioeconomic History    Marital status:      Spouse name: Bony Coelho Jr.    Number of children: 3        Current Outpatient Medications   Medication Instructions    ferrous sulfate (FEOSOL) 325 mg, Oral, 2 times daily    iron fum-B12-IF-C-folic acid (FOLTRIN) 110-0.5 mg capsule 1 capsule, Oral, 2 times daily    Lactobac no.41/Bifidobact no.7 (PROBIOTIC-10 ORAL) Oral, Nature Target Probiotics. .07oz    mv-min/folic/vit K/lycop/coQ10 (DAILY MULTIVITAMIN ORAL) Oral, [place] light multivitamin    thymol/chlorophyllin (CHLOROPHYLL ORAL) Oral, With  "Elderberry, ACV, [place], echinacea, and Vitamin D3, C, B12       Review of patient's allergies indicates:  No Known Allergies     Patient Care Team:  Sandra Love MD as PCP - General (Family Medicine)  Inez Mcclendon LPN as Care Coordinator       Subjective:     Review of Systems    12 point review of systems conducted, negative except as stated in the history of present illness. See HPI for details.      Objective:     Visit Vitals  /80 (BP Location: Left arm, Patient Position: Sitting)   Pulse 71   Temp 97.8 °F (36.6 °C)   Ht 5' 9" (1.753 m)   Wt 101.6 kg (224 lb)   SpO2 100%   BMI 33.08 kg/m²       Physical Exam    General: Alert and oriented, No acute distress.  Head: Normocephalic, Atraumatic.  Eye: Pupils are equal, round and reactive to light, Extraocular movements are intact, Sclera non-icteric.  Ears/Nose/Throat: Normal, Mucosa moist,Clear.  Neck/Thyroid: Supple,Full range of motion.  Respiratory: Clear to auscultation bilaterally  Cardiovascular: Regular rate and rhythm  Gastrointestinal: Soft, Non-tender, Non-distended, Normal bowel sounds  Musculoskeletal: Normal range of motion.  Integumentary: Warm, Dry, Intact  Extremities: No clubbing, cyanosis or edema  Neurologic: No focal deficits, Cranial Nerves II-XII are grossly intact  Psychiatric: Normal interaction, Coherent speech, Euthymic mood  Assessment:       ICD-10-CM ICD-9-CM   1. Iron deficiency anemia due to chronic blood loss  D50.0 280.0   2. Wellness examination  Z00.00 V70.0        Plan:     1. Iron deficiency anemia due to chronic blood loss  Pathophysiology/Treatment/ Dangers Discussed.  Patient to continue iron supplementation-unable to find prescribed iron would like another prescription to be sent into pharmacy  - CBC Auto Differential; Future  - ferrous sulfate (FEOSOL) 325 mg (65 mg iron) Tab tablet; Take 1 tablet (325 mg total) by mouth 2 (two) times daily.  Dispense: 180 tablet; Refill: 2    2. Wellness " examination  Patient given lab orders to be completed before wellness visit.   - CBC Auto Differential; Future  - Comprehensive Metabolic Panel; Future  - Lipid Panel; Future  - TSH; Future  - Hemoglobin A1C; Future  - Urinalysis; Future  - Urinalysis         Follow up if symptoms worsen or fail to improve, for Annual Wellness. In addition to their scheduled follow up, the patient has also been instructed to follow up on as needed basis.     Future Appointments   Date Time Provider Department Center   11/14/2023 11:00 AM Sherri Nunes MD [facility]   5/6/2024  8:15 AM [facility] BREAST CENTER MAMMO1 SCR[facility]   6/19/2024  8:30 AM Sandra Love MD [facility] ELISA Love MD

## 2023-11-06 ENCOUNTER — PATIENT OUTREACH (OUTPATIENT)
Dept: ADMINISTRATIVE | Facility: HOSPITAL | Age: 43
End: 2023-11-06
Payer: COMMERCIAL

## 2023-11-06 NOTE — LETTER
"  This communication is flagged as high priority.        AUTHORIZATION FOR RELEASE OF   CONFIDENTIAL INFORMATION    Dear Staff,    We are seeing Telma Asher, date of birth 1980, in the clinic at [facility]. Sandra Love MD is the patient's PCP. Telma Asher has an outstanding lab/procedure at the time we reviewed her chart. In order to help keep her health information updated, she has authorized us to request the following medical record(s):        (  )  MAMMOGRAM                                      (  )  COLONOSCOPY      ( xx )  PAP SMEAR                                          (  )  OUTSIDE LAB RESULTS     (  )  DEXA SCAN                                          (  )  EYE EXAM            (  )  FOOT EXAM                                          (  )  ENTIRE RECORD     (  )  OUTSIDE IMMUNIZATIONS                 (  )  _______________         Please fax records to [facility], Gautam, Indira, MD,  [phone]  Attn: Delia        If you have any questions, please contact Inez Hernandez" [facility] Coordinator @ [phone]     Patient Name: Telma Asher  : 1980  Patient Phone #: [phone]     "

## 2023-11-10 ENCOUNTER — LAB VISIT (OUTPATIENT)
Dept: LAB | Facility: HOSPITAL | Age: 43
End: 2023-11-10
Attending: FAMILY MEDICINE
Payer: COMMERCIAL

## 2023-11-10 DIAGNOSIS — D50.0 IRON DEFICIENCY ANEMIA DUE TO CHRONIC BLOOD LOSS: ICD-10-CM

## 2023-11-10 LAB
BASOPHILS # BLD AUTO: 0.02 X10(3)/MCL
BASOPHILS NFR BLD AUTO: 0.6 %
EOSINOPHIL # BLD AUTO: 0.03 X10(3)/MCL (ref 0–0.9)
EOSINOPHIL NFR BLD AUTO: 0.9 %
ERYTHROCYTE [DISTWIDTH] IN BLOOD BY AUTOMATED COUNT: 13.9 % (ref 11.5–17)
FERRITIN SERPL-MCNC: 56.76 NG/ML (ref 4.63–204)
HCT VFR BLD AUTO: 37.1 % (ref 37–47)
HEMATOLOGIST REVIEW: NORMAL
HGB BLD-MCNC: 11.9 G/DL (ref 12–16)
IMM GRANULOCYTES # BLD AUTO: 0 X10(3)/MCL (ref 0–0.04)
IMM GRANULOCYTES NFR BLD AUTO: 0 %
IRON SATN MFR SERPL: 31 % (ref 20–50)
IRON SERPL-MCNC: 80 UG/DL (ref 50–170)
LYMPHOCYTES # BLD AUTO: 0.65 X10(3)/MCL (ref 0.6–4.6)
LYMPHOCYTES NFR BLD AUTO: 18.8 %
MCH RBC QN AUTO: 29.8 PG (ref 27–31)
MCHC RBC AUTO-ENTMCNC: 32.1 G/DL (ref 33–36)
MCV RBC AUTO: 93 FL (ref 80–94)
MONOCYTES # BLD AUTO: 0.34 X10(3)/MCL (ref 0.1–1.3)
MONOCYTES NFR BLD AUTO: 9.8 %
NEUTROPHILS # BLD AUTO: 2.42 X10(3)/MCL (ref 2.1–9.2)
NEUTROPHILS NFR BLD AUTO: 69.9 %
NRBC BLD AUTO-RTO: 0 %
PLATELET # BLD AUTO: 221 X10(3)/MCL (ref 130–400)
PMV BLD AUTO: 11.8 FL (ref 7.4–10.4)
RBC # BLD AUTO: 3.99 X10(6)/MCL (ref 4.2–5.4)
TIBC SERPL-MCNC: 181 UG/DL (ref 70–310)
TIBC SERPL-MCNC: 261 UG/DL (ref 250–450)
TRANSFERRIN SERPL-MCNC: 246 MG/DL (ref 180–382)
WBC # SPEC AUTO: 3.46 X10(3)/MCL (ref 4.5–11.5)

## 2023-11-10 PROCEDURE — 82728 ASSAY OF FERRITIN: CPT

## 2023-11-10 PROCEDURE — 36415 COLL VENOUS BLD VENIPUNCTURE: CPT

## 2023-11-10 PROCEDURE — 83540 ASSAY OF IRON: CPT

## 2023-11-10 PROCEDURE — 85025 COMPLETE CBC W/AUTO DIFF WBC: CPT

## 2023-11-13 DIAGNOSIS — R63.8 INCREASED BMI: Primary | ICD-10-CM

## 2023-11-13 NOTE — PROGRESS NOTES
Patient called to discuss test results-would like referral to dietitian in order to help manage her weight and also iron deficiency anemia through diet modification.

## 2023-11-13 NOTE — PROGRESS NOTES
[facility] Breast Surg  Breast Surgical Oncology  Follow-Up Patient Office Visit       Referring Provider: No ref. provider found  PCP: Sandra Love MD   Medical Oncologist: No care team member to display   Radiation Oncologist: No care team member to display   Other Care Providers:     Chief Complaint:   Chief Complaint   Patient presents with    Follow-up     Left Axillary Lymphadenopathy Followup - patient reports no swelling, no pain        Subjective:   Treatment History:  2023 Left Excisional Lymph Node Biopsy    Interval History:  2023 - Telma Asher presents today for follow up for axillary lymphadenopathy. Started Iron pills, noticed she has start to get hives over her lower body.    HPI:  Telma Asher is a 42 y.o. female who presents on 2023 for evaluation of  left axillary adenopathy . She has a history of developing cold like symptoms (mostly consisting of cough, sore throat and sinus congestions) just before presenting for her screening mammogram on 2023. She subsequently found out her daughter had Covid and her symptoms worsened between the time of her additional views and her biopsy. The day after her biopsy she presented to the urgent care where they tested her for RSV (per patient - don't see in care everywhere records) and Covid. She was negative Covid and positive for RSV (per report). She was not given any medications, but conservative measures were discussed. She has since resolved. She also has a negative review of symptoms as listed below.     A detailed patient history was obtained and reviewed. She currently denies any breast issues including rashes, redness, pain, swelling, nipple discharge, or new lumps/masses.     MG breast density: Category B (scattered areas of fibroglandular tissue)      Imagin2023 BL SC MG at [facility] - which revealed in L breast  a focal asymmetry in the upper outer quadrant posterior depth.   There are lymph nodes with apparent cortical thickening within bilateral axillae. BIRADS-0;additional imaging needed.     5/15/2023 L DG MG/US L BREAST LIMITED/R US AXILLA at [facility] - which revealed on   L MG the focal asymmetry in the upper-outer quadrant posterior depth  effaces into normal fibroglandular tissue.  The mammographic view of the L axilla demonstrates apparent cortical thickening of the axillary lymph nodes within the field of view. No other significant mammographic finding in the left breast and axilla.   L US - no suspicious sonographic finding in the upper-outer quadrant.  Along the left breast 1 o'clock - 2 o'clock area 10-12 cm FN, there is only normal fibroductoglandular tissue, correlating with the focal asymmetry recalled from screening mammography. In the level 1 steven stations of both axillae, there are morphologically abnormal lymph nodes with uniform cortical thickening (up to 0.6 cm) and partial to complete effacement of their fatty funmi.  BIRADS-4 suspicious;biopsy recommended.     3. 7/25/2023 BL US AXILLA at [facility] - which revealed persistent enlarged of bilateral axillary lymph nodes (final report still pending)        Pathology:  5/24/2023 Ultrasound-guided Core Needle Biopsy Left Axilla - Lymph node with small CD 19, CD 20, and CD 10 positive, Kappa-restricted B cell population as tested by flow cytometry  IMMUNOHISTOCHEMICAL STAINS:   CD20 AND BCL-2: POSITIVE IN B LYMPHOCYTES.   CD3, CD5 AND CD23: POSITIVE IN T LYMPHOCYTES.   BCL-6 AND CD10: NEGATIVE.   KAPPA AND LAMBDA: INCONCLUSIVE.   KI-67: SHOWS NUCLEAR POSITIVITY IN 3-4% OF LYMPHOCYTES  Note - Morphologic and immunohistochemical analysis does not demonstrate evidence of lymphoma. The significance of the population identified by flow cytometry is unclear and may be due to sampling or oligoclonal reactive process, among others. Recommend excision for further evaluation.    2. 8/7/2023 Left Excisional Lymph Node Biopsy which  revealed reactive lymphoid hyperplasia.     OB/GYN History:  Age at Menarche Onset: 13  Menopausal Status: premenopausal, LMP: Patient's last menstrual period was 2023 (exact date).  Hysterectomy/Oophorectomy: NA, at age NA (one ovary removed)  Hormonal birth control (duration): Yes OCP (estrogen/progesterone).   Pregnancy History:   Age at first live birth: 20  Hormone Replacement Therapy: No, none  Patient did not breast feed.  Patient denies nipple discharge.   Patient denies to previous breast biopsy.   Patient denies to a personal history of breast cancer.     Other Relevant History:  Prior thoracic RT: none  Genetic testing: No  Ashkenazi Adventist descent: No       Family History:  Family History   Problem Relation Age of Onset    No Known Problems Mother     No Known Problems Father     Hypertension Maternal Grandmother     No Known Problems Paternal Grandmother     No Known Problems Paternal Grandfather         Past History:  Past Medical History:   Diagnosis Date    Anemia, unspecified     Endometriosis of uterus 2019    Removed at time of etopic pregnancy    Enlarged lymph node     left axilla        Past Surgical History:   Procedure Laterality Date     SECTION      x3- ; ;      SECTION  ,,, 2019     delivers and 1-etopic pregnancy 2019    LYMPH NODE BIOPSY Left 2023    SURGICAL REMOVAL OF LYMPH NODE Left 2023    Procedure: EXCISION, LYMPH NODE,USING RADIOLOGICAL MARKER - Left Magseed Localization Need SentiMag Need Faxitron Need Flow Cytometric Analysis;  Surgeon: Sherri Nunes MD;  Location: [facility] OR;  Service: General;  Laterality: Left;  Need SentiMag  Need Faxitron  Need Flow Cytometric Analysis        Social History     Socioeconomic History    Marital status:      Spouse name: Bony Coelho Jr.    Number of children: 3   Occupational History    Occupation: [employer]:    Tobacco Use    Smoking status:  Never    Smokeless tobacco: Never   Substance and Sexual Activity    Alcohol use: Yes     Comment: Occasionally for holidays    Drug use: Never    Sexual activity: Yes     Partners: Male     Birth control/protection: None        Body mass index is 33.11 kg/m².     Allergy/Medications:   Review of patient's allergies indicates:  No Known Allergies       Current Outpatient Medications:     ferrous sulfate (FEOSOL) 325 mg (65 mg iron) Tab tablet, Take 1 tablet (325 mg total) by mouth 2 (two) times daily., Disp: 180 tablet, Rfl: 2    Lactobac no.41/Bifidobact no.7 (PROBIOTIC-10 ORAL), Take by mouth. Nature Target Probiotics. .07oz, Disp: , Rfl:     mv-min/folic/vit K/lycop/coQ10 (DAILY MULTIVITAMIN ORAL), Take by mouth. [place] light multivitamin, Disp: , Rfl:     thymol/chlorophyllin (CHLOROPHYLL ORAL), Take by mouth. With Elderberry, ACV, [place], echinacea, and Vitamin D3, C, B12, Disp: , Rfl:     iron fum-B12-IF-C-folic acid (FOLTRIN) 110-0.5 mg capsule, Take 1 capsule by mouth 2 (two) times daily. (Patient not taking: Reported on 11/14/2023), Disp: 60 capsule, Rfl: 11       Review of Systems:  Review of Systems   Constitutional:  Negative for chills, fever and weight loss.   HENT:  Negative for sore throat.    Eyes:  Negative for blurred vision and double vision.   Respiratory:  Negative for cough and shortness of breath.    Cardiovascular:  Negative for chest pain, palpitations and leg swelling.   Gastrointestinal:  Negative for abdominal pain, constipation, diarrhea, heartburn, nausea and vomiting.   Genitourinary:  Negative for dysuria, flank pain, frequency, hematuria and urgency.   Musculoskeletal:  Negative for back pain, joint pain and myalgias.   Neurological:  Negative for dizziness and headaches.   Endo/Heme/Allergies:  Does not bruise/bleed easily.   Psychiatric/Behavioral:  Negative for depression. The patient is not nervous/anxious.    All other systems reviewed and are negative.          Objective:  "    Vitals:  Blood pressure 137/85, pulse 71, temperature 98.4 °F (36.9 °C), temperature source Oral, resp. rate 18, height 5' 9" (1.753 m), weight 101.7 kg (224 lb 3.2 oz), last menstrual period 10/16/2023, SpO2 99 %.      Physical Exam:  General: The patient is awake, alert and oriented times three. The patient is well nourished and in no acute distress.   Left: Examination of the left axilla reveals a well healing incision with some swelling in the axilla likely due to seroma.      Assessment and Plan:     Encounter Diagnoses   Name Primary?    Axillary lymphadenopathy Yes        Continue with observation. Follow-up labs in 3 months (Dr. Love possibly order per patient).       We discussed the possibility of needing additional work-up but would like to see if her imaging re-demonstrates enlarged lymph nodes.     Plan:     Problem List Items Addressed This Visit          Other    Axillary lymphadenopathy - Primary    Current Assessment & Plan     Recommend BL SC MG - due May 2024  Continue to monitor CBC (following with Dr. Love)  Follow-up after imaging in May 2024                  All of questions were answered    Sherri Nunes MD  Breast Surgical Oncology     OFFICE VISIT CODING:      OFFICE VISIT CODING:    Non-face-to-face time included:  Yes Preparing to see the patient such as reviewing the patient record  Yes Obtaining and reviewing separately obtained history  Yes Independently interpreting results  Yes Documenting clinical information in electronic health record  No Ordering appropriate medications  Yes Ordering appropriate tests  Yes Ordering appropriate procedures (including follow-up)  Yes Referring and communicating with other health care professionals (not separately reported)  Yes Care Coordination (not separately reported)    Face-to-face time included:  Yes Performing a medically necessary appropriate history, examination, and/or evaluation  Yes Communicating results to the " patient/family/caregiver  Yes Counseling and educating the patient/family/caregiver  Yes Answering patient/family/caregiver questions    Total Time: 24 minutes    Total time includes both face-to-face and non-face-to-face time personally spent by myself on the day of the visit.

## 2023-11-14 ENCOUNTER — OFFICE VISIT (OUTPATIENT)
Dept: SURGERY | Facility: CLINIC | Age: 43
End: 2023-11-14
Payer: COMMERCIAL

## 2023-11-14 VITALS
OXYGEN SATURATION: 99 % | TEMPERATURE: 98 F | DIASTOLIC BLOOD PRESSURE: 85 MMHG | RESPIRATION RATE: 18 BRPM | BODY MASS INDEX: 33.2 KG/M2 | HEIGHT: 69 IN | SYSTOLIC BLOOD PRESSURE: 137 MMHG | WEIGHT: 224.19 LBS | HEART RATE: 71 BPM

## 2023-11-14 DIAGNOSIS — R59.0 AXILLARY LYMPHADENOPATHY: Primary | ICD-10-CM

## 2023-11-14 PROCEDURE — 3008F BODY MASS INDEX DOCD: CPT | Mod: CPTII,S$GLB,, | Performed by: SURGERY

## 2023-11-14 PROCEDURE — 1160F PR REVIEW ALL MEDS BY PRESCRIBER/CLIN PHARMACIST DOCUMENTED: ICD-10-PCS | Mod: CPTII,S$GLB,, | Performed by: SURGERY

## 2023-11-14 PROCEDURE — 3044F HG A1C LEVEL LT 7.0%: CPT | Mod: CPTII,S$GLB,, | Performed by: SURGERY

## 2023-11-14 PROCEDURE — 3075F PR MOST RECENT SYSTOLIC BLOOD PRESS GE 130-139MM HG: ICD-10-PCS | Mod: CPTII,S$GLB,, | Performed by: SURGERY

## 2023-11-14 PROCEDURE — 1159F MED LIST DOCD IN RCRD: CPT | Mod: CPTII,S$GLB,, | Performed by: SURGERY

## 2023-11-14 PROCEDURE — 99999 PR PBB SHADOW E&M-EST. PATIENT-LVL IV: CPT | Mod: PBBFAC,,, | Performed by: SURGERY

## 2023-11-14 PROCEDURE — 3075F SYST BP GE 130 - 139MM HG: CPT | Mod: CPTII,S$GLB,, | Performed by: SURGERY

## 2023-11-14 PROCEDURE — 3008F PR BODY MASS INDEX (BMI) DOCUMENTED: ICD-10-PCS | Mod: CPTII,S$GLB,, | Performed by: SURGERY

## 2023-11-14 PROCEDURE — 3079F PR MOST RECENT DIASTOLIC BLOOD PRESSURE 80-89 MM HG: ICD-10-PCS | Mod: CPTII,S$GLB,, | Performed by: SURGERY

## 2023-11-14 PROCEDURE — 1160F RVW MEDS BY RX/DR IN RCRD: CPT | Mod: CPTII,S$GLB,, | Performed by: SURGERY

## 2023-11-14 PROCEDURE — 99999 PR PBB SHADOW E&M-EST. PATIENT-LVL IV: ICD-10-PCS | Mod: PBBFAC,,, | Performed by: SURGERY

## 2023-11-14 PROCEDURE — 1159F PR MEDICATION LIST DOCUMENTED IN MEDICAL RECORD: ICD-10-PCS | Mod: CPTII,S$GLB,, | Performed by: SURGERY

## 2023-11-14 PROCEDURE — 3044F PR MOST RECENT HEMOGLOBIN A1C LEVEL <7.0%: ICD-10-PCS | Mod: CPTII,S$GLB,, | Performed by: SURGERY

## 2023-11-14 PROCEDURE — 99213 PR OFFICE/OUTPT VISIT, EST, LEVL III, 20-29 MIN: ICD-10-PCS | Mod: S$GLB,,, | Performed by: SURGERY

## 2023-11-14 PROCEDURE — 99213 OFFICE O/P EST LOW 20 MIN: CPT | Mod: S$GLB,,, | Performed by: SURGERY

## 2023-11-14 PROCEDURE — 3079F DIAST BP 80-89 MM HG: CPT | Mod: CPTII,S$GLB,, | Performed by: SURGERY

## 2023-11-14 NOTE — ASSESSMENT & PLAN NOTE
Recommend BL SC MG - due May 2024  Continue to monitor CBC (following with Dr. Love)  Follow-up after imaging in May 2024

## 2023-11-21 ENCOUNTER — PATIENT OUTREACH (OUTPATIENT)
Dept: ADMINISTRATIVE | Facility: HOSPITAL | Age: 43
End: 2023-11-21
Payer: COMMERCIAL

## 2023-12-18 ENCOUNTER — OFFICE VISIT (OUTPATIENT)
Dept: URGENT CARE | Facility: CLINIC | Age: 43
End: 2023-12-18
Payer: COMMERCIAL

## 2023-12-18 VITALS
RESPIRATION RATE: 18 BRPM | WEIGHT: 224 LBS | HEIGHT: 69 IN | BODY MASS INDEX: 33.18 KG/M2 | OXYGEN SATURATION: 100 % | TEMPERATURE: 98 F | DIASTOLIC BLOOD PRESSURE: 97 MMHG | SYSTOLIC BLOOD PRESSURE: 149 MMHG | HEART RATE: 71 BPM

## 2023-12-18 DIAGNOSIS — R68.89 FLU-LIKE SYMPTOMS: ICD-10-CM

## 2023-12-18 DIAGNOSIS — R53.83 FATIGUE, UNSPECIFIED TYPE: Primary | ICD-10-CM

## 2023-12-18 DIAGNOSIS — J02.9 SORE THROAT: ICD-10-CM

## 2023-12-18 DIAGNOSIS — R09.81 NASAL CONGESTION: ICD-10-CM

## 2023-12-18 LAB
CTP QC/QA: YES
MOLECULAR STREP A: NEGATIVE
POC MOLECULAR INFLUENZA A AGN: NEGATIVE
POC MOLECULAR INFLUENZA B AGN: NEGATIVE
SARS-COV-2 RDRP RESP QL NAA+PROBE: NEGATIVE

## 2023-12-18 PROCEDURE — 87635 SARS-COV-2 COVID-19 AMP PRB: CPT | Mod: QW,,,

## 2023-12-18 PROCEDURE — 87635: ICD-10-PCS | Mod: QW,,,

## 2023-12-18 PROCEDURE — 87651 STREP A DNA AMP PROBE: CPT | Mod: QW,,,

## 2023-12-18 PROCEDURE — 99213 OFFICE O/P EST LOW 20 MIN: CPT | Mod: ,,,

## 2023-12-18 PROCEDURE — 87502 INFLUENZA DNA AMP PROBE: CPT | Mod: QW,,,

## 2023-12-18 PROCEDURE — 99213 PR OFFICE/OUTPT VISIT, EST, LEVL III, 20-29 MIN: ICD-10-PCS | Mod: ,,,

## 2023-12-18 PROCEDURE — 87502 POCT INFLUENZA A/B MOLECULAR: ICD-10-PCS | Mod: QW,,,

## 2023-12-18 PROCEDURE — 87651 POCT STREP A MOLECULAR: ICD-10-PCS | Mod: QW,,,

## 2023-12-18 NOTE — PATIENT INSTRUCTIONS
Excuse for today and tomorrow,     Return tomorrow for nurse visit with repeat swabs.      Drink plenty of fluids. Get plenty of rest.   Claritin or Zyrtec 10 mg for nasal congestion.  Nasal spray such as Nasacort or Flonase for congestion.  Warm saltwater gargles for sore throat.  Warm water with honey to help coat the throat.  Throat lozenges.  Tylenol or ibuprofen as needed for body aches, sore throat and fever.   Chloraseptic Spray for worsening sore throat  Call or return to clinic as needed   Go to the ER with any significant change or worsening of symptoms.   Follow up with your primary care doctor.

## 2023-12-18 NOTE — PROGRESS NOTES
"Subjective:      Patient ID: Telma Asher is a 43 y.o. female.    Vitals:  height is 5' 9" (1.753 m) and weight is 101.6 kg (224 lb). Her oral temperature is 97.5 °F (36.4 °C). Her blood pressure is 149/97 (abnormal) and her pulse is 71. Her respiration is 18 and oxygen saturation is 100%.     Chief Complaint: Nasal Congestion     Patient is a 43 y.o. female who presents to urgent care with complaints of congestion, body aches, fatigue started last night.  She does report mild sore throat, nasal congestion upon awakening this morning.  Patient also reports hives to bilateral legs x3 days. Positive exposure to flu. Alleviating factors include lozenges, benadryl with mild amount of relief. Patient denies significant cough, neck stiffness, rash, nausea, vomiting, diarrhea.    ROS   Objective:     Physical Exam   Constitutional: She is oriented to person, place, and time. She appears well-developed. She is cooperative.  Non-toxic appearance. She does not appear ill. No distress.   HENT:   Head: Normocephalic and atraumatic.   Ears:   Right Ear: Hearing, tympanic membrane, external ear and ear canal normal.   Left Ear: Hearing, tympanic membrane, external ear and ear canal normal.   Nose: Nose normal. No mucosal edema, rhinorrhea or nasal deformity. No epistaxis. Right sinus exhibits no maxillary sinus tenderness and no frontal sinus tenderness. Left sinus exhibits no maxillary sinus tenderness and no frontal sinus tenderness.   Mouth/Throat: Uvula is midline, oropharynx is clear and moist and mucous membranes are normal. Mucous membranes are moist. No trismus in the jaw. Normal dentition. No uvula swelling. No oropharyngeal exudate, posterior oropharyngeal edema or posterior oropharyngeal erythema.      Comments: Thick postnasal drip  Eyes: Conjunctivae and lids are normal. No scleral icterus.   Neck: Trachea normal and phonation normal. Neck supple. No edema present. No erythema present. No neck rigidity " present.   Cardiovascular: Normal rate, regular rhythm, normal heart sounds and normal pulses.   Pulmonary/Chest: Effort normal and breath sounds normal. She has no decreased breath sounds. She has no wheezes. She has no rhonchi. She has no rales.   Abdominal: Normal appearance.   Musculoskeletal: Normal range of motion.         General: No deformity. Normal range of motion.   Lymphadenopathy:     She has no cervical adenopathy.   Neurological: She is alert and oriented to person, place, and time. She exhibits normal muscle tone. Coordination normal.   Skin: Skin is warm, dry, intact, not diaphoretic and not pale.   Psychiatric: Her speech is normal and behavior is normal. Judgment and thought content normal.   Nursing note and vitals reviewed.      Assessment:     1. Fatigue, unspecified type    2. Nasal congestion    3. Sore throat    4. Flu-like symptoms        Plan:       Fatigue, unspecified type    Nasal congestion  -     POCT COVID-19 Rapid Screening  -     POCT Influenza A/B Molecular  -     POCT Strep A, Molecular    Sore throat    Flu-like symptoms      Negative flu, COVID, strep, discussed returning tomorrow for nurse visit with repeat swabs as symptoms began less than 24 hours ago.      Drink plenty of fluids. Get plenty of rest.   Claritin or Zyrtec 10 mg for nasal congestion.  Nasal spray such as Nasacort or Flonase for congestion.  Warm saltwater gargles for sore throat.  Warm water with honey to help coat the throat.  Throat lozenges.  Tylenol or ibuprofen as needed for body aches, sore throat and fever.   Chloraseptic Spray for worsening sore throat  Call or return to clinic as needed   Go to the ER with any significant change or worsening of symptoms.   Follow up with your primary care doctor.

## 2023-12-19 ENCOUNTER — CLINICAL SUPPORT (OUTPATIENT)
Dept: URGENT CARE | Facility: CLINIC | Age: 43
End: 2023-12-19
Payer: COMMERCIAL

## 2023-12-19 VITALS — RESPIRATION RATE: 18 BRPM | OXYGEN SATURATION: 97 %

## 2023-12-19 DIAGNOSIS — R50.9 FEVER, UNSPECIFIED FEVER CAUSE: Primary | ICD-10-CM

## 2023-12-19 PROCEDURE — 87635 SARS-COV-2 COVID-19 AMP PRB: CPT | Mod: QW,,, | Performed by: PHYSICIAN ASSISTANT

## 2023-12-19 PROCEDURE — 87651 POCT STREP A MOLECULAR: ICD-10-PCS | Mod: QW,,, | Performed by: PHYSICIAN ASSISTANT

## 2023-12-19 PROCEDURE — 87635: ICD-10-PCS | Mod: QW,,, | Performed by: PHYSICIAN ASSISTANT

## 2023-12-19 PROCEDURE — 87502 POCT INFLUENZA A/B MOLECULAR: ICD-10-PCS | Mod: QW,,, | Performed by: PHYSICIAN ASSISTANT

## 2023-12-19 PROCEDURE — 87651 STREP A DNA AMP PROBE: CPT | Mod: QW,,, | Performed by: PHYSICIAN ASSISTANT

## 2023-12-19 PROCEDURE — 87502 INFLUENZA DNA AMP PROBE: CPT | Mod: QW,,, | Performed by: PHYSICIAN ASSISTANT

## 2023-12-19 NOTE — PROGRESS NOTES
Patient is a 43 y.o. female who presents to urgent care requesting a nurse visit. Patient swabbed for Covid, Flu and Strep. All results are negative, informed patient of results. Patient verbalized understanding and had no further questions or concerns.

## 2024-05-06 ENCOUNTER — HOSPITAL ENCOUNTER (OUTPATIENT)
Dept: RADIOLOGY | Facility: HOSPITAL | Age: 44
Discharge: HOME | End: 2024-05-06
Attending: SURGERY
Payer: COMMERCIAL

## 2024-05-06 DIAGNOSIS — Z12.31 ENCOUNTER FOR SCREENING MAMMOGRAM FOR MALIGNANT NEOPLASM OF BREAST: ICD-10-CM

## 2024-05-06 PROCEDURE — 77067 SCR MAMMO BI INCL CAD: CPT | Mod: TC

## 2024-05-06 PROCEDURE — 77063 BREAST TOMOSYNTHESIS BI: CPT | Mod: 26,,, | Performed by: RADIOLOGY

## 2024-05-06 PROCEDURE — 77067 SCR MAMMO BI INCL CAD: CPT | Mod: 26,,, | Performed by: RADIOLOGY

## 2024-06-09 PROBLEM — C85.94: Status: ACTIVE | Noted: 2024-06-09

## 2024-12-08 DIAGNOSIS — D50.0 IRON DEFICIENCY ANEMIA DUE TO CHRONIC BLOOD LOSS: ICD-10-CM

## 2024-12-09 RX ORDER — FERROUS SULFATE 325(65) MG
TABLET ORAL 2 TIMES DAILY
Qty: 180 TABLET | Refills: 2 | Status: SHIPPED | OUTPATIENT
Start: 2024-12-09

## 2025-03-11 ENCOUNTER — OFFICE VISIT (OUTPATIENT)
Dept: URGENT CARE | Facility: CLINIC | Age: 45
End: 2025-03-11
Payer: COMMERCIAL

## 2025-03-11 VITALS
HEART RATE: 80 BPM | BODY MASS INDEX: 34.06 KG/M2 | OXYGEN SATURATION: 100 % | SYSTOLIC BLOOD PRESSURE: 173 MMHG | TEMPERATURE: 98 F | HEIGHT: 68 IN | RESPIRATION RATE: 18 BRPM | DIASTOLIC BLOOD PRESSURE: 105 MMHG

## 2025-03-11 DIAGNOSIS — R07.9 CHEST PAIN, UNSPECIFIED TYPE: Primary | ICD-10-CM

## 2025-03-11 DIAGNOSIS — M25.512 ACUTE PAIN OF LEFT SHOULDER: ICD-10-CM

## 2025-03-11 LAB
EKG 12-LEAD: NORMAL
PR INTERVAL: NORMAL
PRT AXES: NORMAL
QRS DURATION: NORMAL
QT/QTC: NORMAL
VENTRICULAR RATE: NORMAL

## 2025-03-11 PROCEDURE — 93000 ELECTROCARDIOGRAM COMPLETE: CPT | Mod: ,,, | Performed by: CLINIC/CENTER

## 2025-03-11 PROCEDURE — 99213 OFFICE O/P EST LOW 20 MIN: CPT | Mod: 25,,, | Performed by: CLINIC/CENTER

## 2025-03-11 RX ORDER — NORETHINDRONE ACETATE AND ETHINYL ESTRADIOL 1MG-20(21)
1 KIT ORAL DAILY
COMMUNITY

## 2025-03-11 NOTE — PATIENT INSTRUCTIONS
As discussed, it is recommended that you present to the ER now for further evaluation to prevent a delay in care.   Offered transport via EMS but patient/family declines despite informed risks including death.  Opts for private transport via personal vehicle.

## 2025-03-11 NOTE — PROGRESS NOTES
"Subjective:      Patient ID: Telma Asher is a 44 y.o. female.    Vitals:  height is 5' 8" (1.727 m). Her oral temperature is 98.1 °F (36.7 °C). Her blood pressure is 173/105 (abnormal) and her pulse is 80. Her respiration is 18 and oxygen saturation is 100%.     Chief Complaint: Back Pain (Entered by patient)     Patient is a 44 y.o. female who presents to urgent care with complaints of Back Pain x 10 days. Pt states the pain started in Chest moved unto left shoulder and around to upper back on the lt side. Alleviating factors include pain relief cream with mild amount of relief for a few hours and The pain came right back.  Patient states that the pain does not get worse with movement or palpation.  Patient states that his constant and nagging.    Back Pain      Musculoskeletal:  Positive for back pain.      Objective:     Physical Exam   Constitutional: She is oriented to person, place, and time. She appears well-developed. She is cooperative.   HENT:   Head: Normocephalic and atraumatic.   Ears:   Right Ear: Hearing, tympanic membrane, external ear and ear canal normal.   Left Ear: Hearing, tympanic membrane, external ear and ear canal normal.   Nose: Nose normal. No mucosal edema or nasal deformity. No epistaxis. Right sinus exhibits no maxillary sinus tenderness and no frontal sinus tenderness. Left sinus exhibits no maxillary sinus tenderness and no frontal sinus tenderness.   Mouth/Throat: Uvula is midline, oropharynx is clear and moist and mucous membranes are normal. No trismus in the jaw. Normal dentition. No uvula swelling.   Eyes: Conjunctivae and lids are normal.   Neck: Trachea normal and phonation normal. Neck supple.   Cardiovascular: Normal rate, regular rhythm, normal heart sounds and normal pulses.   Pulmonary/Chest: Effort normal and breath sounds normal.   Abdominal: Normal appearance and bowel sounds are normal. Soft.   Musculoskeletal: Normal range of motion.         General: Normal " range of motion.      Comments: Patient denies any pain with range of motion exercises of the left arm.  Patient states that pain is constant and nagging.  Patient reports pain around the left upper chest and shoulder region at present.  There is no tenderness with shoulder or arm palpation   Neurological: She is alert and oriented to person, place, and time. She exhibits normal muscle tone.   Skin: Skin is warm, dry and intact.   Psychiatric: Her speech is normal and behavior is normal. Judgment and thought content normal.   Nursing note and vitals reviewed.         Previous History      Review of patient's allergies indicates:  No Known Allergies    Past Medical History:   Diagnosis Date    Anemia, unspecified     Endometriosis of uterus 2019    Removed at time of etopic pregnancy    Enlarged lymph node     left axilla    Fibroid, uterine      Current Outpatient Medications   Medication Instructions    FEROSUL 325 mg (65 mg iron) Tab tablet Oral, 2 times daily    Lactobac no.41/Bifidobact no.7 (PROBIOTIC-10 ORAL) Take by mouth. Nature Target Probiotics. .07oz    mv-min/folic/vit K/lycop/coQ10 (DAILY MULTIVITAMIN ORAL) Take by mouth. [place] light multivitamin    norethindrone-ethinyl estradiol (JUNEL FE ) 1 mg-20 mcg (21)/75 mg (7) per tablet 1 tablet, Daily    thymol/chlorophyllin (CHLOROPHYLL ORAL) Take by mouth. With Elderberry, ACV, [place], echinacea, and Vitamin D3, C, B12     Past Surgical History:   Procedure Laterality Date     SECTION      x3- ; ;      SECTION  ,2006,, 2019     delivers and 1-etopic pregnancy 2019    LYMPH NODE BIOPSY Left 2023    SURGICAL REMOVAL OF LYMPH NODE Left 2023    Procedure: EXCISION, LYMPH NODE,USING RADIOLOGICAL MARKER - Left Magseed Localization Need SentiMag Need Faxitron Need Flow Cytometric Analysis;  Surgeon: Sherri Nunes MD;  Location: [facility] OR;  Service: General;  Laterality: Left;  Need SentiMag  Need  "Faxitron  Need Flow Cytometric Analysis    TUBAL LIGATION       Family History   Problem Relation Name Age of Onset    No Known Problems Mother Mile Asher     No Known Problems Father Carrillo Young     Hypertension Maternal Grandmother Emily Asher     No Known Problems Paternal Grandmother      No Known Problems Paternal Grandfather         Social History[1]     Physical Exam      Vital Signs Reviewed   BP (!) 173/105 (BP Location: Right arm, Patient Position: Sitting)   Pulse 80   Temp 98.1 °F (36.7 °C) (Oral)   Resp 18   Ht 5' 8" (1.727 m)   SpO2 100%   BMI 34.06 kg/m²        Procedures    Procedures     Labs     Results for orders placed or performed in visit on 03/11/25   POCT EKG 12-LEAD (NOT FOR [facility] USE)    Collection Time: 03/11/25 10:58 AM   Result Value Ref Range    EKG 12-Lead      Ventricular Rate      NJ Interval      QRS Duration      QT/QTc      PRT Axes        Assessment:     1. Chest pain, unspecified type    2. Acute pain of left shoulder      EKG shows sinus rhythm, minor inferior repolarization disturbance, consider ischemia no ST elevations.  Rate of 82, one hundred fifty-four, QRS of 96.  I believe that it is prudent to have the patient be evaluated in the emergency room.  Patient states she has a history of anemia but has never been formally diagnosed with hypertension.  Patient has blood pressure of 173/105 after several readings.  Patient states that her chest pain is very minimal at present and is only complaining of left arm pain.  I believe that the patient needs a proper cardiac workup and blood work today.  Patient agrees to be evaluated in the emergency room.  I am also concerned that the pain in her shoulder does not get worse with movement and is not reproducible.  Patient states she will drive herself to the ER and feeding in rider ambulance  Plan:     As discussed, it is recommended that you present to the ER now for further evaluation to prevent a delay in " care.   Offered transport via EMS but patient/family declines despite informed risks including death.  Opts for private transport via personal vehicle.      Chest pain, unspecified type  -     POCT EKG 12-LEAD (NOT FOR [facility] USE)    Acute pain of left shoulder                         [1]   Social History  Tobacco Use    Smoking status: Never    Smokeless tobacco: Never   Substance Use Topics    Alcohol use: Not Currently     Comment: Occasionally for holidays    Drug use: Never

## 2025-03-17 ENCOUNTER — TELEPHONE (OUTPATIENT)
Dept: FAMILY MEDICINE | Facility: CLINIC | Age: 45
End: 2025-03-17
Payer: COMMERCIAL

## 2025-03-17 NOTE — TELEPHONE ENCOUNTER
Patient stated she needs advice about her high blood pressure - she's currently at a retreat right now so she can just leave and go to a pharmacy - she said she's never been on anything before and she was not given meds at the ER

## 2025-03-18 ENCOUNTER — PATIENT MESSAGE (OUTPATIENT)
Dept: FAMILY MEDICINE | Facility: CLINIC | Age: 45
End: 2025-03-18
Payer: COMMERCIAL

## 2025-03-20 ENCOUNTER — TELEPHONE (OUTPATIENT)
Dept: FAMILY MEDICINE | Facility: CLINIC | Age: 45
End: 2025-03-20
Payer: COMMERCIAL

## 2025-03-20 NOTE — TELEPHONE ENCOUNTER
----- Message from Sandra Love MD sent at 3/20/2025  8:32 AM CDT -----  I have called patient multiple times-please arrange for patient to have an appointment as soon as possible

## 2025-03-23 NOTE — PROGRESS NOTES
Patient ID: [id number]     Chief Complaint: Annual Exam        HPI:     Telma Asher is a 44 y.o. female here today for an annual wellness. Reviewed and discussed lab results.   Patient was evaluated in the emergency room on -she presented to the emergency room with left forearm pain with radiation towards the chest.  In the emergency room lab work was normal-CT angiogram was normal-x-ray of the shoulder showed no acute abnormality.  Pain eventually resolved.  Patient was found to have elevated blood pressure in the emergency room-she was instructed to follow-up with the PCP to discuss treatment options for high blood pressure  1) Hypertension: Patient has been monitoring her blood pressure at home since the emergency room visit-blood pressure is consistently running above 140/90.  Past Medical History:   Diagnosis Date    Anemia, unspecified     Endometriosis of uterus 2019    Removed at time of etopic pregnancy    Enlarged lymph node     left axilla    Fibroid, uterine         Past Surgical History:   Procedure Laterality Date     SECTION      x3- 2001; 2006; 2008     SECTION  ,,, 2019     delivers and 1-etopic pregnancy 2019    LYMPH NODE BIOPSY Left 2023    SURGICAL REMOVAL OF LYMPH NODE Left 2023    Procedure: EXCISION, LYMPH NODE,USING RADIOLOGICAL MARKER - Left Magseed Localization Need SentiMag Need Faxitron Need Flow Cytometric Analysis;  Surgeon: Sherri Nunes MD;  Location: [facility] OR;  Service: General;  Laterality: Left;  Need SentiMag  Need Faxitron  Need Flow Cytometric Analysis    TUBAL LIGATION          Social History[1]     Social History[2]     Current Outpatient Medications   Medication Instructions    FEROSUL 325 mg (65 mg iron) Tab tablet Oral, 2 times daily    Lactobac no.41/Bifidobact no.7 (PROBIOTIC-10 ORAL) Take by mouth. Nature Target Probiotics. .07oz    mv-min/folic/vit K/lycop/coQ10 (DAILY MULTIVITAMIN ORAL) Take by  "mouth. [place] light multivitamin    norethindrone-ethinyl estradiol (JUNEL FE 1/20) 1 mg-20 mcg (21)/75 mg (7) per tablet 1 tablet, Daily    olmesartan (BENICAR) 40 mg, Oral, Daily    thymol/chlorophyllin (CHLOROPHYLL ORAL) Take by mouth. With Elderberry, ACV, [place], echinacea, and Vitamin D3, C, B12       Review of patient's allergies indicates:  No Known Allergies     Patient Care Team:  Sandra Love MD as PCP - General (Family Medicine)  Inez Mcclendon LPN as Care Coordinator     Subjective:     Review of Systems    12 point review of systems conducted, negative except as stated in the history of present illness. See HPI for details.      Objective:     Visit Vitals  BP (!) 158/96   Pulse 87   Resp 18   Ht 5' 8" (1.727 m)   Wt 110.7 kg (244 lb)   LMP 03/05/2025 (Approximate)   SpO2 99%   BMI 37.10 kg/m²       Physical Exam    General: Alert and oriented, No acute distress.  Head: Normocephalic, Atraumatic.  Eye: Pupils are equal, round and reactive to light, Extraocular movements are intact, Sclera non-icteric.  Ears/Nose/Throat: Normal, Mucosa moist,Clear.  Neck/Thyroid: Supple, Non-tender,  No palpable thyromegaly or thyroid nodule, No lymphadenopathy, No JVD, Full range of motion.  Respiratory: Clear to auscultation bilaterally; No wheezes, rales or rhonchi,Non-labored respirations, Symmetrical chest wall expansion.  Cardiovascular: S1/S2 normal  Gastrointestinal: Soft, Non-tender, Non-distended, Normal bowel sounds, No palpable organomegaly.  Musculoskeletal: Normal range of motion.  Integumentary: Warm, Dry, Intact, No suspicious lesions or rashes.  Extremities: No clubbing, cyanosis or edema  Neurologic: No focal deficits, Cranial Nerves II-XII are grossly intact, Motor strength normal upper and lower extremities, Sensory exam intact.  Psychiatric: Normal interaction, Coherent speech, Euthymic mood, Appropriate affect       Assessment:       ICD-10-CM ICD-9-CM   1. Wellness examination  Z00.00 " "V70.0   2. Essential hypertension  I10 401.9   3. Pre-diabetes  R73.03 790.29   4. Severe obesity (BMI 35.0-39.9) with comorbidity  E66.01 278.01   5. Encounter for screening for cardiovascular disorders  Z13.6 V81.2        Plan:       Cervical Cancer Screening - Pap Up to date     Breast Cancer Screening - Mammogram Up to date     Colon Cancer Screening - Colon cancer screening to start at age 45     Eye Exam - Recommend annually.    Dental Exam - Recommend biannual exams.     Vaccinations -   There is no immunization history on file for this patient. Immunization guidelines reviewed with the patient.  Encourage patient to prevent disease through immunizations.      1. Wellness examination (Primary)  Wellness: Five Rules Reviewed:  Healthy community-Relationships/ Water/Nutrition-Real Food, Limit Fast Food/ Exercise 20-30 minutes most days of the week/ Mental health- "Is your work a calling or paycheck" Define 'What is your purpose-what matters to you' Stress- Is an Individual Response- Therefore Can be Controlled by the Individual. Meditation/ Immunizations/Multivitamin use-Vitamin D3/ Screenings     2. Essential hypertension  Patient started on Benicar 40 mg-patient to start with taking half a tablet for 1 week-monitoring blood pressure-if blood pressure is still consistently above 140/90-to increase medication to 40 mg. Blood pressure goal of less than 130/80-blood pressure is elevated. Continue to encourage diet and activity modifications. Including stress management. Pathophysiology/treatment/dangers discussed.    - olmesartan (BENICAR) 40 MG tablet; Take 1 tablet (40 mg total) by mouth once daily.  Dispense: 90 tablet; Refill: 3    3. Pre-diabetes  Hemoglobin A1c  .  Lab Results   Component Value Date    HGBA1C 6.2 03/25/2025     Normal less than 5.7 - Diagnosis of Type 2 Diabetes Mellitus at 6.5. Encourage diet and activity modification- medication options discussed- Pathophysiology/treatment/dangers " discussed.    4. Encounter for screening for cardiovascular disorders  Check studies management based upon results.  Pathophysiology/treatment/dangers discussed.    - CT Calcium Scoring Cardiac; Future    5. Severe obesity (BMI 35.0-39.9) with comorbidity  Patient encouraged to continue with diet Modifications/ Limit Calories to 1500 a day- Every Meal should have Fiber and Protein/Mindful eating-(Honor Hunger/ Make Peace with Food/ Challenge the food police/feel fullness/monitor your emotions/resect your body/feel the difference in movement) Chew Each Bite 10-15 times/ Drink 64 oz of water daily- 17 oz of water 30 minutes before every meal/Limit processed food- cook meals- limit eating out/fast food to less than once a month          The patient's Health Maintenance was reviewed and the following appears to be due at this time:   Health Maintenance Due   Topic Date Due    Pneumococcal Vaccines (Age 0-49) (1 of 2 - PCV) Never done    TETANUS VACCINE  04/13/2009    Influenza Vaccine (1) Never done    Mammogram  05/06/2025         Follow up in about 6 weeks (around 5/7/2025) for HTN. In addition to their scheduled follow up, the patient has also been instructed to follow up on as needed basis.     Future Appointments   Date Time Provider Department Center   5/7/2025 10:15 AM Sandra Love MD [facility]        Sandra Love MD           [1]   Social History  Tobacco Use    Smoking status: Never    Smokeless tobacco: Never   Substance Use Topics    Alcohol use: Not Currently     Comment: Occasionally for holidays    Drug use: Never   [2]   Social History  Socioeconomic History    Marital status:      Spouse name: Bony Coelho Jr.    Number of children: 3

## 2025-03-24 ENCOUNTER — TELEPHONE (OUTPATIENT)
Dept: FAMILY MEDICINE | Facility: CLINIC | Age: 45
End: 2025-03-24
Payer: COMMERCIAL

## 2025-03-24 DIAGNOSIS — Z00.00 WELLNESS EXAMINATION: Primary | ICD-10-CM

## 2025-03-24 NOTE — TELEPHONE ENCOUNTER
Pt stated she just did labs and did not want to repeat them, I did advise her it was a few labs missing from her wellness panel .. Orders placed. Pt will go to [facility]

## 2025-03-24 NOTE — TELEPHONE ENCOUNTER
----- Message from Sandra Love MD sent at 3/22/2025  9:18 PM CDT -----  Patient has not hadd wellness visit since 2023- last complete labs in 11/2024-would recommend baseline labs before visit.

## 2025-03-25 ENCOUNTER — RESULTS FOLLOW-UP (OUTPATIENT)
Dept: FAMILY MEDICINE | Facility: CLINIC | Age: 45
End: 2025-03-25

## 2025-03-25 ENCOUNTER — LAB VISIT (OUTPATIENT)
Dept: LAB | Facility: HOSPITAL | Age: 45
End: 2025-03-25
Attending: FAMILY MEDICINE
Payer: COMMERCIAL

## 2025-03-25 DIAGNOSIS — Z00.00 WELLNESS EXAMINATION: ICD-10-CM

## 2025-03-25 LAB
25(OH)D3+25(OH)D2 SERPL-MCNC: 37 NG/ML (ref 30–80)
ALBUMIN SERPL-MCNC: 3.5 G/DL (ref 3.5–5)
ALBUMIN/GLOB SERPL: 0.9 RATIO (ref 1.1–2)
ALP SERPL-CCNC: 46 UNIT/L (ref 40–150)
ALT SERPL-CCNC: 18 UNIT/L (ref 0–55)
ANION GAP SERPL CALC-SCNC: 6 MEQ/L
AST SERPL-CCNC: 19 UNIT/L (ref 11–45)
BACTERIA #/AREA URNS AUTO: ABNORMAL /HPF
BASOPHILS # BLD AUTO: 0.03 X10(3)/MCL
BASOPHILS NFR BLD AUTO: 0.9 %
BILIRUB SERPL-MCNC: 0.9 MG/DL
BILIRUB UR QL STRIP.AUTO: NEGATIVE
BUN SERPL-MCNC: 7.4 MG/DL (ref 7–18.7)
CALCIUM SERPL-MCNC: 8.9 MG/DL (ref 8.4–10.2)
CHLORIDE SERPL-SCNC: 109 MMOL/L (ref 98–107)
CHOLEST SERPL-MCNC: 150 MG/DL
CHOLEST/HDLC SERPL: 3 {RATIO} (ref 0–5)
CLARITY UR: CLEAR
CO2 SERPL-SCNC: 24 MMOL/L (ref 22–29)
COLOR UR AUTO: ABNORMAL
CREAT SERPL-MCNC: 0.74 MG/DL (ref 0.55–1.02)
CREAT/UREA NIT SERPL: 10
EOSINOPHIL # BLD AUTO: 0.05 X10(3)/MCL (ref 0–0.9)
EOSINOPHIL NFR BLD AUTO: 1.5 %
ERYTHROCYTE [DISTWIDTH] IN BLOOD BY AUTOMATED COUNT: 14.6 % (ref 11.5–17)
EST. AVERAGE GLUCOSE BLD GHB EST-MCNC: 131.2 MG/DL
GFR SERPLBLD CREATININE-BSD FMLA CKD-EPI: >60 ML/MIN/1.73/M2
GLOBULIN SER-MCNC: 3.9 GM/DL (ref 2.4–3.5)
GLUCOSE SERPL-MCNC: 106 MG/DL (ref 74–100)
GLUCOSE UR QL STRIP: NORMAL
HBA1C MFR BLD: 6.2 %
HCT VFR BLD AUTO: 37.6 % (ref 37–47)
HDLC SERPL-MCNC: 43 MG/DL (ref 35–60)
HGB BLD-MCNC: 12.3 G/DL (ref 12–16)
HGB UR QL STRIP: NEGATIVE
IMM GRANULOCYTES # BLD AUTO: 0.01 X10(3)/MCL (ref 0–0.04)
IMM GRANULOCYTES NFR BLD AUTO: 0.3 %
KETONES UR QL STRIP: NEGATIVE
LDLC SERPL CALC-MCNC: 97 MG/DL (ref 50–140)
LEUKOCYTE ESTERASE UR QL STRIP: NEGATIVE
LYMPHOCYTES # BLD AUTO: 0.73 X10(3)/MCL (ref 0.6–4.6)
LYMPHOCYTES NFR BLD AUTO: 21.2 %
MCH RBC QN AUTO: 29.4 PG (ref 27–31)
MCHC RBC AUTO-ENTMCNC: 32.7 G/DL (ref 33–36)
MCV RBC AUTO: 90 FL (ref 80–94)
MONOCYTES # BLD AUTO: 0.33 X10(3)/MCL (ref 0.1–1.3)
MONOCYTES NFR BLD AUTO: 9.6 %
NEUTROPHILS # BLD AUTO: 2.29 X10(3)/MCL (ref 2.1–9.2)
NEUTROPHILS NFR BLD AUTO: 66.5 %
NITRITE UR QL STRIP: NEGATIVE
NRBC BLD AUTO-RTO: 0 %
PH UR STRIP: 6 [PH]
PLATELET # BLD AUTO: 250 X10(3)/MCL (ref 130–400)
PMV BLD AUTO: 11.6 FL (ref 7.4–10.4)
POTASSIUM SERPL-SCNC: 3.7 MMOL/L (ref 3.5–5.1)
PROT SERPL-MCNC: 7.4 GM/DL (ref 6.4–8.3)
PROT UR QL STRIP: NEGATIVE
RBC # BLD AUTO: 4.18 X10(6)/MCL (ref 4.2–5.4)
RBC #/AREA URNS AUTO: ABNORMAL /HPF
SODIUM SERPL-SCNC: 139 MMOL/L (ref 136–145)
SP GR UR STRIP.AUTO: 1.01 (ref 1–1.03)
SPERM URNS QL MICRO: ABNORMAL /HPF
SQUAMOUS #/AREA URNS LPF: ABNORMAL /HPF
TRIGL SERPL-MCNC: 52 MG/DL (ref 37–140)
TSH SERPL-ACNC: 1.3 UIU/ML (ref 0.35–4.94)
UROBILINOGEN UR STRIP-ACNC: NORMAL
VLDLC SERPL CALC-MCNC: 10 MG/DL
WBC # BLD AUTO: 3.44 X10(3)/MCL (ref 4.5–11.5)
WBC #/AREA URNS AUTO: ABNORMAL /HPF

## 2025-03-25 PROCEDURE — 80053 COMPREHEN METABOLIC PANEL: CPT

## 2025-03-25 PROCEDURE — 82306 VITAMIN D 25 HYDROXY: CPT

## 2025-03-25 PROCEDURE — 81001 URINALYSIS AUTO W/SCOPE: CPT

## 2025-03-25 PROCEDURE — 80061 LIPID PANEL: CPT

## 2025-03-25 PROCEDURE — 84443 ASSAY THYROID STIM HORMONE: CPT

## 2025-03-25 PROCEDURE — 36415 COLL VENOUS BLD VENIPUNCTURE: CPT

## 2025-03-25 PROCEDURE — 85025 COMPLETE CBC W/AUTO DIFF WBC: CPT

## 2025-03-25 PROCEDURE — 83036 HEMOGLOBIN GLYCOSYLATED A1C: CPT

## 2025-03-26 ENCOUNTER — OFFICE VISIT (OUTPATIENT)
Dept: FAMILY MEDICINE | Facility: CLINIC | Age: 45
End: 2025-03-26
Payer: COMMERCIAL

## 2025-03-26 VITALS
OXYGEN SATURATION: 99 % | HEART RATE: 87 BPM | HEIGHT: 68 IN | DIASTOLIC BLOOD PRESSURE: 96 MMHG | BODY MASS INDEX: 36.98 KG/M2 | RESPIRATION RATE: 18 BRPM | SYSTOLIC BLOOD PRESSURE: 158 MMHG | WEIGHT: 244 LBS

## 2025-03-26 DIAGNOSIS — I10 ESSENTIAL HYPERTENSION: ICD-10-CM

## 2025-03-26 DIAGNOSIS — Z00.00 WELLNESS EXAMINATION: Primary | ICD-10-CM

## 2025-03-26 DIAGNOSIS — R73.03 PRE-DIABETES: ICD-10-CM

## 2025-03-26 DIAGNOSIS — E66.01 SEVERE OBESITY (BMI 35.0-39.9) WITH COMORBIDITY: ICD-10-CM

## 2025-03-26 DIAGNOSIS — Z13.6 ENCOUNTER FOR SCREENING FOR CARDIOVASCULAR DISORDERS: ICD-10-CM

## 2025-03-26 PROBLEM — C85.94: Status: RESOLVED | Noted: 2024-06-09 | Resolved: 2025-03-26

## 2025-03-26 RX ORDER — OLMESARTAN MEDOXOMIL 40 MG/1
40 TABLET ORAL DAILY
Qty: 90 TABLET | Refills: 3 | Status: SHIPPED | OUTPATIENT
Start: 2025-03-26 | End: 2026-03-26

## 2025-04-02 ENCOUNTER — RESULTS FOLLOW-UP (OUTPATIENT)
Dept: FAMILY MEDICINE | Facility: CLINIC | Age: 45
End: 2025-04-02

## 2025-05-02 NOTE — PROGRESS NOTES
Patient ID: [id number]     Chief Complaint: Hypertension      HPI:     Telma Asher is a 44 y.o. female here today for follow up:   Has been working to make lifestyle modifications in order to increase quality of life.   1) Hypertension: Patient has been taking olmesartan 40 mg. BP is lower-but bottom number is still consistently above 90-patient does not notice that the blood pressure is higher in the middle of the afternoon. No symptoms associated with increased BP such as headache/ visual changes/ dizziness/ chest pain.       Past Medical History:   Diagnosis Date    Endometriosis of uterus 2019    Removed at time of etopic pregnancy    Fibroid, uterine         Past Surgical History:   Procedure Laterality Date     SECTION      x3- ; 2006; 2008     SECTION  ,,, 2019     delivers and 1-etopic pregnancy 2019    LYMPH NODE BIOPSY Left 2023    SURGICAL REMOVAL OF LYMPH NODE Left 2023    Procedure: EXCISION, LYMPH NODE,USING RADIOLOGICAL MARKER - Left Magseed Localization Need SentiMag Need Faxitron Need Flow Cytometric Analysis;  Surgeon: Sherri Nunes MD;  Location: [facility] OR;  Service: General;  Laterality: Left;  Need SentiMag  Need Faxitron  Need Flow Cytometric Analysis    TUBAL LIGATION          Social History[1]     Social History[2]     Current Outpatient Medications   Medication Instructions    amLODIPine (NORVASC) 5 mg, Oral, Daily    FEROSUL 325 mg (65 mg iron) Tab tablet Oral, 2 times daily    Lactobac no.41/Bifidobact no.7 (PROBIOTIC-10 ORAL) Take by mouth. Nature Target Probiotics. .07oz    mv-min/folic/vit K/lycop/coQ10 (DAILY MULTIVITAMIN ORAL) Take by mouth. [place] light multivitamin    norethindrone-ethinyl estradiol (JUNEL FE 1/20) 1 mg-20 mcg (21)/75 mg (7) per tablet 1 tablet, Daily    olmesartan (BENICAR) 40 mg, Oral, Daily       Review of patient's allergies indicates:  No Known Allergies     Patient Care Team:  Sandra Love MD  "as PCP - General (Family Medicine)  Inez Mcclendon LPN as Care Coordinator       Subjective:     Review of Systems    12 point review of systems conducted, negative except as stated in the history of present illness. See HPI for details.      Objective:     Visit Vitals  /80   Pulse 86   Resp 16   Ht 5' 8" (1.727 m)   Wt 109.7 kg (241 lb 12.8 oz)   LMP 04/29/2025 (Approximate)   SpO2 99%   BMI 36.77 kg/m²       Physical Exam    General: Alert and oriented, No acute distress.  Head: Normocephalic, Atraumatic.  Eye: Pupils are equal, round and reactive to light, Extraocular movements are intact, Sclera non-icteric.  Ears/Nose/Throat: Normal, Mucosa moist,Clear.  Neck/Thyroid: Supple, Non-tender  Respiratory: Clear to auscultation bilaterally  Cardiovascular: S1/S2 normal  Gastrointestinal: Soft, Non-tender, Non-distended, Normal bowel sounds, No palpable organomegaly.  Musculoskeletal: Normal range of motion.  Integumentary: Warm, Dry  Extremities: No clubbing, cyanosis or edema  Neurologic: No focal deficits, Cranial Nerves II-XII are grossly intact  Psychiatric: Normal interaction, Coherent speech       Assessment:       ICD-10-CM ICD-9-CM   1. Essential hypertension  I10 401.9   2. Pre-diabetes  R73.03 790.29   3. Wellness examination  Z00.00 V70.0        Plan:     1. Essential hypertension (Primary)  Patient has been taking olmesartan 40 mg-blood pressure is still not adequately controlled-patient to start Norvasc 5 mg. Blood pressure goal of less than 130/80. Continue to encourage diet and activity modifications. Including stress management. Pathophysiology/treatment/dangers discussed.    - amLODIPine (NORVASC) 5 MG tablet; Take 1 tablet (5 mg total) by mouth once daily.  Dispense: 90 tablet; Refill: 3    2. Pre-diabetes  Hemoglobin A1c  .  Lab Results   Component Value Date    HGBA1C 6.2 03/25/2025     Normal less than 5.7 - Diagnosis of Type 2 Diabetes Mellitus at 6.5. Encourage diet and activity " modification- recheck labs in 6 months management based on finding- Pathophysiology/treatment/dangers discussed.    - Comprehensive Metabolic Panel; Future  - Hemoglobin A1C; Future    3. Wellness examination  Check studies management based upon results.  Pathophysiology/treatment/dangers discussed.    - Mammo Digital Screening Bilat; Future    Follow up if symptoms worsen or fail to improve, for Follow Up 10/2025. In addition to their scheduled follow up, the patient has also been instructed to follow up on as needed basis.     Future Appointments   Date Time Provider Department Center   10/28/2025  9:00 AM Sandra Love MD [facility]        Sandra Love MD         [1]   Social History  Tobacco Use    Smoking status: Never    Smokeless tobacco: Never   Substance Use Topics    Alcohol use: Not Currently     Comment: Occasionally for holidays    Drug use: Never   [2]   Social History  Socioeconomic History    Marital status:      Spouse name: Bony Murdockjess Izaguirre    Number of children: 3

## 2025-05-07 ENCOUNTER — OFFICE VISIT (OUTPATIENT)
Dept: FAMILY MEDICINE | Facility: CLINIC | Age: 45
End: 2025-05-07
Payer: COMMERCIAL

## 2025-05-07 VITALS
SYSTOLIC BLOOD PRESSURE: 130 MMHG | OXYGEN SATURATION: 99 % | DIASTOLIC BLOOD PRESSURE: 80 MMHG | RESPIRATION RATE: 16 BRPM | WEIGHT: 241.81 LBS | HEIGHT: 68 IN | BODY MASS INDEX: 36.65 KG/M2 | HEART RATE: 86 BPM

## 2025-05-07 DIAGNOSIS — R73.03 PRE-DIABETES: ICD-10-CM

## 2025-05-07 DIAGNOSIS — I10 ESSENTIAL HYPERTENSION: Primary | ICD-10-CM

## 2025-05-07 DIAGNOSIS — Z00.00 WELLNESS EXAMINATION: ICD-10-CM

## 2025-05-07 RX ORDER — AMLODIPINE BESYLATE 5 MG/1
5 TABLET ORAL DAILY
Qty: 90 TABLET | Refills: 3 | Status: SHIPPED | OUTPATIENT
Start: 2025-05-07 | End: 2026-05-07

## (undated) DEVICE — SOL IRRI STRL WATER 1000ML

## (undated) DEVICE — GAUZE SPONGE 4X4 12PLY

## (undated) DEVICE — Device

## (undated) DEVICE — ADHESIVE DERMABOND ADVANCED

## (undated) DEVICE — ELECTRODE BLADE INSULATED 1 IN

## (undated) DEVICE — DRAPE FLUID WARMER ORS 44X44IN

## (undated) DEVICE — GLOVE PROTEXIS PI SYN SURG 6.5

## (undated) DEVICE — ELECTRODE PATIENT RETURN DISP

## (undated) DEVICE — NDL HYPO REG 25G X 1 1/2

## (undated) DEVICE — SPONGE LAP STRL 18X18IN

## (undated) DEVICE — SUT STRATAFIX 4-0 30CM PS-2

## (undated) DEVICE — APPLICATOR CHLORAPREP ORN 26ML

## (undated) DEVICE — NDL SYR 10ML 18X1.5 LL BLUNT

## (undated) DEVICE — BLADE SURG STAINLESS STEEL #15

## (undated) DEVICE — SOL NACL IRR 1000ML BTL

## (undated) DEVICE — SUPPORT ULNA NERVE PROTECTOR

## (undated) DEVICE — STRIP MEDI WND CLSR 1/2X4IN

## (undated) DEVICE — GLOVE PROTEXIS PI SYN SURG 6.0

## (undated) DEVICE — SUT MCRYL PLUS 3-0 PS2 27IN

## (undated) DEVICE — GLOVE PROTEXIS BLUE LATEX 6.5

## (undated) DEVICE — GLOVE 6.0 PROTEXIS PI MICRO